# Patient Record
Sex: FEMALE | Race: WHITE | NOT HISPANIC OR LATINO | Employment: FULL TIME | URBAN - METROPOLITAN AREA
[De-identification: names, ages, dates, MRNs, and addresses within clinical notes are randomized per-mention and may not be internally consistent; named-entity substitution may affect disease eponyms.]

---

## 2022-01-17 ENCOUNTER — HOSPITAL ENCOUNTER (EMERGENCY)
Facility: HOSPITAL | Age: 22
Discharge: HOME | End: 2022-01-18
Admitting: EMERGENCY MEDICINE

## 2022-01-17 DIAGNOSIS — N93.9 ABNORMAL UTERINE BLEEDING: Primary | ICD-10-CM

## 2022-01-17 LAB
ABO GROUP BLD: NORMAL
ALBUMIN SERPL-MCNC: 4.6 G/DL (ref 3.5–5.2)
ALBUMIN/GLOB SERPL: 1.4 G/DL
ALP SERPL-CCNC: 70 U/L (ref 39–117)
ALT SERPL W P-5'-P-CCNC: 23 U/L (ref 1–33)
ANION GAP SERPL CALCULATED.3IONS-SCNC: 13 MMOL/L (ref 5–15)
AST SERPL-CCNC: 27 U/L (ref 1–32)
BASOPHILS # BLD AUTO: 0 10*3/MM3 (ref 0–0.2)
BASOPHILS NFR BLD AUTO: 0.7 % (ref 0–1.5)
BILIRUB SERPL-MCNC: 0.4 MG/DL (ref 0–1.2)
BILIRUB UR QL STRIP: NEGATIVE
BUN SERPL-MCNC: 9 MG/DL (ref 6–20)
BUN/CREAT SERPL: 13 (ref 7–25)
CALCIUM SPEC-SCNC: 9.6 MG/DL (ref 8.6–10.5)
CHLORIDE SERPL-SCNC: 104 MMOL/L (ref 98–107)
CLARITY UR: ABNORMAL
CLUE CELLS SPEC QL WET PREP: ABNORMAL
CO2 SERPL-SCNC: 25 MMOL/L (ref 22–29)
COLOR UR: YELLOW
CREAT SERPL-MCNC: 0.69 MG/DL (ref 0.57–1)
DEPRECATED RDW RBC AUTO: 40.7 FL (ref 37–54)
EOSINOPHIL # BLD AUTO: 0.1 10*3/MM3 (ref 0–0.4)
EOSINOPHIL NFR BLD AUTO: 2.1 % (ref 0.3–6.2)
ERYTHROCYTE [DISTWIDTH] IN BLOOD BY AUTOMATED COUNT: 12.7 % (ref 12.3–15.4)
GFR SERPL CREATININE-BSD FRML MDRD: 110 ML/MIN/1.73
GLOBULIN UR ELPH-MCNC: 3.4 GM/DL
GLUCOSE SERPL-MCNC: 76 MG/DL (ref 65–99)
GLUCOSE UR STRIP-MCNC: NEGATIVE MG/DL
HCG INTACT+B SERPL-ACNC: <0.1 MIU/ML
HCT VFR BLD AUTO: 43.1 % (ref 34–46.6)
HGB BLD-MCNC: 15.1 G/DL (ref 12–15.9)
HGB UR QL STRIP.AUTO: NEGATIVE
HYDATID CYST SPEC WET PREP: ABNORMAL
KETONES UR QL STRIP: NEGATIVE
LEUKOCYTE ESTERASE UR QL STRIP.AUTO: NEGATIVE
LYMPHOCYTES # BLD AUTO: 2.7 10*3/MM3 (ref 0.7–3.1)
LYMPHOCYTES NFR BLD AUTO: 53.5 % (ref 19.6–45.3)
MCH RBC QN AUTO: 31.9 PG (ref 26.6–33)
MCHC RBC AUTO-ENTMCNC: 35 G/DL (ref 31.5–35.7)
MCV RBC AUTO: 91.1 FL (ref 79–97)
MONOCYTES # BLD AUTO: 0.6 10*3/MM3 (ref 0.1–0.9)
MONOCYTES NFR BLD AUTO: 11 % (ref 5–12)
NEUTROPHILS NFR BLD AUTO: 1.6 10*3/MM3 (ref 1.7–7)
NEUTROPHILS NFR BLD AUTO: 32.7 % (ref 42.7–76)
NITRITE UR QL STRIP: NEGATIVE
NRBC BLD AUTO-RTO: 0.3 /100 WBC (ref 0–0.2)
NUMBER OF DOSES: NORMAL
PH UR STRIP.AUTO: 8.5 [PH] (ref 5–8)
PLATELET # BLD AUTO: 332 10*3/MM3 (ref 140–450)
PMV BLD AUTO: 6.6 FL (ref 6–12)
POTASSIUM SERPL-SCNC: 3.9 MMOL/L (ref 3.5–5.2)
PROT SERPL-MCNC: 8 G/DL (ref 6–8.5)
PROT UR QL STRIP: NEGATIVE
RBC # BLD AUTO: 4.73 10*6/MM3 (ref 3.77–5.28)
RH BLD: POSITIVE
SODIUM SERPL-SCNC: 142 MMOL/L (ref 136–145)
SP GR UR STRIP: 1.02 (ref 1–1.03)
T VAGINALIS SPEC QL WET PREP: ABNORMAL
UROBILINOGEN UR QL STRIP: ABNORMAL
WBC NRBC COR # BLD: 5 10*3/MM3 (ref 3.4–10.8)
WBC SPEC QL WET PREP: ABNORMAL
YEAST GENITAL QL WET PREP: ABNORMAL

## 2022-01-17 PROCEDURE — 99283 EMERGENCY DEPT VISIT LOW MDM: CPT

## 2022-01-17 PROCEDURE — 87491 CHLMYD TRACH DNA AMP PROBE: CPT | Performed by: NURSE PRACTITIONER

## 2022-01-17 PROCEDURE — 86900 BLOOD TYPING SEROLOGIC ABO: CPT | Performed by: NURSE PRACTITIONER

## 2022-01-17 PROCEDURE — 80053 COMPREHEN METABOLIC PANEL: CPT | Performed by: NURSE PRACTITIONER

## 2022-01-17 PROCEDURE — 85025 COMPLETE CBC W/AUTO DIFF WBC: CPT | Performed by: NURSE PRACTITIONER

## 2022-01-17 PROCEDURE — 81003 URINALYSIS AUTO W/O SCOPE: CPT | Performed by: NURSE PRACTITIONER

## 2022-01-17 PROCEDURE — P9612 CATHETERIZE FOR URINE SPEC: HCPCS

## 2022-01-17 PROCEDURE — 84702 CHORIONIC GONADOTROPIN TEST: CPT | Performed by: NURSE PRACTITIONER

## 2022-01-17 PROCEDURE — 87591 N.GONORRHOEAE DNA AMP PROB: CPT | Performed by: NURSE PRACTITIONER

## 2022-01-17 PROCEDURE — 87210 SMEAR WET MOUNT SALINE/INK: CPT | Performed by: NURSE PRACTITIONER

## 2022-01-17 PROCEDURE — 86901 BLOOD TYPING SEROLOGIC RH(D): CPT | Performed by: NURSE PRACTITIONER

## 2022-01-17 RX ORDER — SODIUM CHLORIDE 0.9 % (FLUSH) 0.9 %
10 SYRINGE (ML) INJECTION AS NEEDED
Status: DISCONTINUED | OUTPATIENT
Start: 2022-01-17 | End: 2022-01-18 | Stop reason: HOSPADM

## 2022-01-17 RX ORDER — MEDROXYPROGESTERONE ACETATE 150 MG/ML
150 INJECTION, SUSPENSION INTRAMUSCULAR
COMMUNITY

## 2022-01-18 VITALS
BODY MASS INDEX: 26.33 KG/M2 | TEMPERATURE: 98 F | DIASTOLIC BLOOD PRESSURE: 79 MMHG | HEART RATE: 92 BPM | WEIGHT: 158 LBS | HEIGHT: 65 IN | OXYGEN SATURATION: 100 % | SYSTOLIC BLOOD PRESSURE: 121 MMHG | RESPIRATION RATE: 15 BRPM

## 2022-01-18 LAB
C TRACH RRNA CVX QL NAA+PROBE: NOT DETECTED
N GONORRHOEA RRNA SPEC QL NAA+PROBE: NOT DETECTED

## 2022-01-18 NOTE — DISCHARGE INSTRUCTIONS
Needs Tylenol or ibuprofen for any pain or cramping.  Safe sex practices.  Follow-up with your primary care provider and/or OB/GYN.  Return for new or worsening symptoms.

## 2022-01-18 NOTE — ED PROVIDER NOTES
Subjective   Patient is a 21-year-old white female with no significant medical history who presents today with complaints of vaginal bleeding.  She states she takes a Depo-Provera shot every 3 months.  She states she has not missed any doses.  She states she does not normally have a menstrual cycle with this.  She states she had unprotected intercourse at the beginning of December.  She states 2 weeks ago she took home pregnancy test that returned positive.  She states she has had some breast tenderness and some low abdominal cramping over the last week or so.  She states over the last couple of days she has developed vaginal bleeding and at times has passed a couple of clots.  She states today later this evening the bleeding has subsided but she continues to have some light brown discharge.  She denies any concern for STI.  She denies any fever chills nausea vomiting or urinary complaint.          Review of Systems   Constitutional: Negative for chills and fever.   Gastrointestinal: Negative for abdominal pain, nausea and vomiting.   Genitourinary: Positive for pelvic pain, vaginal bleeding and vaginal discharge. Negative for dysuria, frequency and urgency.       Past Medical History:   Diagnosis Date   • Known health problems: none        No Known Allergies    Past Surgical History:   Procedure Laterality Date   • NO PAST SURGERIES         History reviewed. No pertinent family history.    Social History     Socioeconomic History   • Marital status: Single   Tobacco Use   • Smoking status: Never Smoker   • Smokeless tobacco: Never Used   Substance and Sexual Activity   • Alcohol use: Never   • Drug use: Never           Objective   Physical Exam  Vital signs and triage nurse note reviewed.  Constitutional: Awake, alert; well-developed and well-nourished. No acute distress is noted.  HEENT: Normocephalic, atraumatic; pupils are PERRL with intact EOM; oropharynx is pink and moist without exudate or erythema.  No  drooling or pooling of oral secretions.  Neck: Supple, full range of motion without pain; no cervical lymphadenopathy. Normal phonation.  Cardiovascular: Regular rate and rhythm, normal S1-S2.  No murmur noted.  Pulmonary: Respiratory effort regular nonlabored, breath sounds clear to auscultation all fields.  Abdomen: Soft, nontender, nondistended with normoactive bowel sounds; no rebound or guarding.  Musculoskeletal: Independent range of motion of all extremities with no palpable tenderness or edema.  Neuro: Alert oriented x3, speech is clear and appropriate, GCS 15.    Skin: Flesh tone, warm, dry, intact; no erythematous or petechial rash or lesion.    The patient was placed in lithotomy position. External genitalia were normal. There was no evidence of rash, external lesions or abscesses. Speculum was inserted. Posterior vaginal vault was examined.    There was no blood in the posterior vaginal vault.    There was minimal white discharge in the poster vaginal vault.    Bimanual exam was performed. Uterus was nonenlarged and nontender there was no adnexal enlargement or tenderness. No cervical motion tenderness. No masses were felt.     Procedures           ED Course      Labs Reviewed   WET PREP, GENITAL - Abnormal; Notable for the following components:       Result Value    WBC'S 2+ WBC's seen (*)     All other components within normal limits   URINALYSIS W/ CULTURE IF INDICATED - Abnormal; Notable for the following components:    Appearance, UA Turbid (*)     pH, UA 8.5 (*)     All other components within normal limits    Narrative:     Urine microscopic not indicated.   CBC WITH AUTO DIFFERENTIAL - Abnormal; Notable for the following components:    Neutrophil % 32.7 (*)     Lymphocyte % 53.5 (*)     Neutrophils, Absolute 1.60 (*)     nRBC 0.3 (*)     All other components within normal limits   CHLAMYDIA TRACHOMATIS, NEISSERIA GONORRHOEAE, PCR - Normal   COMPREHENSIVE METABOLIC PANEL    Narrative:     GFR  Normal >60  Chronic Kidney Disease <60  Kidney Failure <15     HCG, QUANTITATIVE, PREGNANCY    Narrative:     HCG Ranges by Gestational Age    Females - non-pregnant premenopausal   </= 1mIU/mL HCG  Females - postmenopausal               </= 7mIU/mL HCG    3 Weeks         5.8 -    71.2 mIU/mL  4 Weeks         9.5 -     750 mIU/mL  5 Weeks         217 -   7,138 mIU/mL  6 Weeks         158 -  31,795 mIU/mL  7 Weeks       3,697 - 163,563 mIU/mL  8 Weeks      32,065 - 149,571 mIU/mL  9 Weeks      63,803 - 151,410 mIU/mL  10 Weeks     46,509 - 186,977 mIU/mL  12 Weeks     27,832 - 210,612 mIU/mL  14 Weeks     13,950 -  62,530 mIU/mL  15 Weeks     12,039 -  70,971 mIU/mL  16 Weeks      9,040 -  56,451 mIU/mL  17 Weeks      8,175 -  55,868 mIU/mL  18 Weeks      8,099 -  58,176 mIU/mL  Results may be falsely decreased if patient taking Biotin.      RHIG EVALUATION   DOSES OF RH IMMUNE GLOBULIN   CBC AND DIFFERENTIAL    Narrative:     The following orders were created for panel order CBC & Differential.  Procedure                               Abnormality         Status                     ---------                               -----------         ------                     CBC Auto Differential[[id number]]        Abnormal            Final result                 Please view results for these tests on the individual orders.     No radiology results for the last day  Medications   sodium chloride 0.9 % flush 10 mL (has no administration in time range)                                                MDM  Number of Diagnoses or Management Options  Abnormal uterine bleeding  Diagnosis management comments: Patient had an IV established.  She had labs obtained.    Work-up: CBC and metabolic panel are unremarkable.  Serum quantitative hCG is<0.10.  Urinalysis shows no blood or sign of infection.  Wet prep reveals 2+ WBCs, no yeast, no BV, no trichomonas.  Gonorrhea chlamydia both negative.    On reexamination patient is  resting comfortably.  She is in no distress.  She has no new complaints.  She has had no bleeding since arrival to the ED.  She is hemodynamically stable.  She is well-appearing and has stable vital signs.  No evidence of pregnancy on her work-up today.  No signs of infection.    Diagnosis and treatment plan discussed with patient.  Patient agreeable to plan.   I discussed findings with patient who voices understanding of discharge instructions, signs and symptoms requiring return to ED; discharged improved and in stable condition with follow up for re-evaluation.         Amount and/or Complexity of Data Reviewed  Clinical lab tests: reviewed and ordered    Patient Progress  Patient progress: stable      Final diagnoses:   Abnormal uterine bleeding       ED Disposition  ED Disposition     ED Disposition Condition Comment    Discharge Stable           Mya Lyman MD  [address] [place], [place] [id number] [phone]    Schedule an appointment as soon as possible for a visit       OB[address] [address] [phone]  Schedule an appointment as soon as possible for a visit            Medication List      No changes were made to your prescriptions during this visit.          Brenda Marie, APRN  01/18/22 0046

## 2023-02-05 ENCOUNTER — HOSPITAL ENCOUNTER (EMERGENCY)
Facility: HOSPITAL | Age: 23
Discharge: HOME | End: 2023-02-05
Attending: EMERGENCY MEDICINE | Admitting: EMERGENCY MEDICINE
Payer: COMMERCIAL

## 2023-02-05 VITALS
TEMPERATURE: 98.9 F | OXYGEN SATURATION: 99 % | WEIGHT: 165 LBS | HEART RATE: 100 BPM | SYSTOLIC BLOOD PRESSURE: 127 MMHG | DIASTOLIC BLOOD PRESSURE: 86 MMHG | BODY MASS INDEX: 28.17 KG/M2 | RESPIRATION RATE: 16 BRPM | HEIGHT: 64 IN

## 2023-02-05 DIAGNOSIS — J06.9 UPPER RESPIRATORY TRACT INFECTION, UNSPECIFIED TYPE: ICD-10-CM

## 2023-02-05 DIAGNOSIS — J02.9 PHARYNGITIS, UNSPECIFIED ETIOLOGY: Primary | ICD-10-CM

## 2023-02-05 LAB
FLUAV SUBTYP SPEC NAA+PROBE: NOT DETECTED
FLUBV RNA ISLT QL NAA+PROBE: NOT DETECTED
SARS-COV-2 RNA RESP QL NAA+PROBE: NOT DETECTED
STREP A PCR: NOT DETECTED

## 2023-02-05 PROCEDURE — 99283 EMERGENCY DEPT VISIT LOW MDM: CPT

## 2023-02-05 PROCEDURE — 87636 SARSCOV2 & INF A&B AMP PRB: CPT | Performed by: EMERGENCY MEDICINE

## 2023-02-05 PROCEDURE — 87651 STREP A DNA AMP PROBE: CPT | Performed by: EMERGENCY MEDICINE

## 2023-02-05 PROCEDURE — C9803 HOPD COVID-19 SPEC COLLECT: HCPCS | Performed by: EMERGENCY MEDICINE

## 2023-02-05 PROCEDURE — 0202U NFCT DS 22 TRGT SARS-COV-2: CPT | Performed by: EMERGENCY MEDICINE

## 2023-02-05 PROCEDURE — 99283 EMERGENCY DEPT VISIT LOW MDM: CPT | Performed by: EMERGENCY MEDICINE

## 2023-02-05 PROCEDURE — 87081 CULTURE SCREEN ONLY: CPT | Performed by: EMERGENCY MEDICINE

## 2023-02-06 LAB
B PARAPERT DNA SPEC QL NAA+PROBE: NOT DETECTED
B PERT DNA SPEC QL NAA+PROBE: NOT DETECTED
C PNEUM DNA NPH QL NAA+NON-PROBE: NOT DETECTED
FLUAV SUBTYP SPEC NAA+PROBE: NOT DETECTED
FLUBV RNA ISLT QL NAA+PROBE: NOT DETECTED
HADV DNA SPEC NAA+PROBE: NOT DETECTED
HCOV 229E RNA SPEC QL NAA+PROBE: DETECTED
HCOV HKU1 RNA SPEC QL NAA+PROBE: NOT DETECTED
HCOV NL63 RNA SPEC QL NAA+PROBE: NOT DETECTED
HCOV OC43 RNA SPEC QL NAA+PROBE: NOT DETECTED
HMPV RNA NPH QL NAA+NON-PROBE: NOT DETECTED
HPIV1 RNA ISLT QL NAA+PROBE: NOT DETECTED
HPIV2 RNA SPEC QL NAA+PROBE: NOT DETECTED
HPIV3 RNA NPH QL NAA+PROBE: NOT DETECTED
HPIV4 P GENE NPH QL NAA+PROBE: NOT DETECTED
M PNEUMO IGG SER IA-ACNC: NOT DETECTED
RHINOVIRUS RNA SPEC NAA+PROBE: NOT DETECTED
RSV RNA NPH QL NAA+NON-PROBE: NOT DETECTED
SARS-COV-2 RNA NPH QL NAA+NON-PROBE: NOT DETECTED

## 2023-02-06 NOTE — DISCHARGE INSTRUCTIONS
In medicine there is always a level of diagnostic uncertainty. Even if it is low. For this reason, immediately return to the emergency department if you have any new symptoms, worsening symptoms, change of symptoms, or if you have any other concerns. We would be happy to re-evaluate you. Otherwise, please take your medications as prescribed and follow-up as recommended. This paperwork can be taken to your primary care provider to further discuss any non-emergent lab and/or imaging findings.    You can alternate 650 mg acetaminophen and  600 mg ibuprofen every 3 hours as needed for pain. Example: noon - ibuprofen, 3PM - acetaminophen, 6PM - ibuprofen, 9PM - acetaminophen. These medications can be bought without a prescription over the counter.    You can use NeilMed Sinus Rinse, Afrin, and Sucrets as directed on the box.

## 2023-02-06 NOTE — FSED PROVIDER NOTE
[facility]    Pt Name: Kenyetta Sarah  MRN: [id number]  Birthdate 2000  Date of evaluation: 2/5/2023  Provider: Se Longoria MD     CHIEF CONCERN     Chief Complaint   Patient presents with   • Sore Throat   • Fever       HISTORY OF PRESENT ILLNESS   Reports 2 days sore throat, congestion, malaise, fever, chills. Severity moderate. Taking OTC medications with improvement. No known sick contacts. No cough.      REVIEW OF SYSTEMS     Constitutional: Per HPI.  HENT: Per HP. No congestion.  Eye: No double vision. No blurry vision. No eye pain.  Respiratory: No cough. No dyspnea. No wheezing.  Cardiovascular: No chest pain. No palpitations. No lightheadedness. No leg swelling.  GI: No abdominal pain. No diarrhea. No constipation. No nausea. No vomiting.  : No frequency. No dysuria. No urgency. Take OCP daily.  MSK: No arthralgias. No myalgias.   Skin: No rashes.   Neuro: No numbness. No tingling. No weakness. No headache.  Psych: No suicidal ideation. No homicidal ideation.    PAST MEDICAL HISTORY     Past Medical History:   Diagnosis Date   • Known health problems: none        SURGICAL HISTORY       Past Surgical History:   Procedure Laterality Date   • NO PAST SURGERIES         CURRENT MEDICATIONS          Medication List      CONTINUE taking these medications    medroxyPROGESTERone 150 MG/ML injection  Commonly known as: DEPO-PROVERA            ALLERGIES     Patient has no known allergies.    FAMILY HISTORY     History reviewed. No pertinent family history.     SOCIAL HISTORY       Social History     Socioeconomic History   • Marital status: Single   Tobacco Use   • Smoking status: Never   • Smokeless tobacco: Never   Substance and Sexual Activity   • Alcohol use: Never   • Drug use: Never       SCREENINGS           PHYSICAL EXAM      Vitals:    02/05/23 2213   BP: 127/86   BP Location: Left arm   Patient Position: Sitting   Pulse: 100   Resp: 16   Temp: 98.9 °F (37.2 °C)  "  TempSrc: Oral   SpO2: 99%   Weight: 74.8 kg (165 lb)   Height: 162.6 cm (64\")     General: Nontoxic. Conversational. Appears stated age.  Skin: Warm. Dry. Intact. No systemic rashes or lesions.  Eyes: Pupils are equally round and reactive to light. Extraocular motions are intact. Clear sclera. No gaze preference.  HENT: Atraumatic. Normocephalic. Trachea midline. Mucosal membranes moist. No trismus. No malocclusion. Uvula midline. No stridor. Tolerating secretions. Posterior oropharynx erythematous. Exudate present. Nares patent.   Lungs: Clear to auscultation throughout. Nonlabored breathing.  Cardiovascular: No murmurs, rubs, or gallops appreciated. No pedal edema. No JVD. Symmetric radial pulses. Symmetric dorsal pedis pulses.   Abdomen: Soft and nontender. Nondistended. Bowel sounds are present.  Musculoskeletal: No asymmetry. No deformities. No effusions.  Neuro: Alert. Oriented to person, place, year. No facial asymmetry. Facial sensation symmetric. No aphasia. No dysarthria. Midline tongue protrusion. Posterior oropharynx with symmetric elevation. Ambulatory with purposeful use of upper extremities.   Psych: Appropriate. Cooperative.    REVIEWED DIAGNOSTIC RESULTS     RADIOLOGY   No radiology results for the last day      LABS:  Labs Reviewed   RAPID STREP A SCREEN - Normal   COVID-19 AND FLU A/B, NP SWAB IN TRANSPORT MEDIA 8-12 HR TAT - Normal    Narrative:     Fact sheet for providers: https://www.fda.gov/media/691341/download    Fact sheet for patients: https://www.fda.gov/media/965106/download    Test performed by PCR.   RESPIRATORY PANEL PCR W/ COVID-19 (SARS-COV-2) [facility] IN-HOUSE, NP SWAB IN UTM/VTP, 3-4 HR TAT   THROAT / UPPER RESPIRATORY CULTURE       All other labs were within normal range or not returned as of this dictation.     EMERGENCY DEPARTMENT COURSE and DIFFERENTIAL DIAGNOSIS/MDM:     · Nursing notes were reviewed. Patient was evaluated. Orders placed as below.  I had " an extensive conversation with Kenyetta Sarah regarding testing and treatment of influenza. Kenyetta Sarah is with capacity to make decision and DECLINES targeted and/or empiric treatment. Kenyetta Sarah is with capacity to make decisions. The decision was respected.   · Labs reviewed.  · Patient re-evaluated. All test findings discussed. No new symptoms.    Medications - No data to display  Orders Placed This Encounter   Procedures   • Rapid Strep A Screen - Swab, Throat   • COVID-19 and FLU A/B PCR - Swab, Nasopharynx   • Respiratory Panel PCR w/COVID-19(SARS-CoV-2) [facility] In-House, NP Swab in UTM/VTM, 3-4 HR TAT - Swab, Nasopharynx   • Throat / Upper Respiratory Culture - Swab, Throat       PROCEDURES (if any):  Procedures    FINAL IMPRESSION     1. Pharyngitis, unspecified etiology    2. Upper respiratory tract infection, unspecified type        Following this emergency department evaluation, I estimate a LOW probability of drainable periapical abscess, peritonsillar abscess, retropharyngeal abscess, other suppurative complication, meningitis, encephalitis, Lemierre's syndrome, Miguel's angina, epiglottitis, carotid dissection, mastoiditis, angioedema, anaphylaxis, foreign body, among other etiologies to presentation.     I estimate a VERY LOW probability for acute life or limb-threatening illness. I discussed this with Kenyetta Sarah and discussed symptoms that would warrant return to the emergency department. I underscored the importance of following up as directed in the discharge instructions. Kenyetta Sarah understood and was agreeable. All questions were answered.      DISPOSITION/PLAN     ED Disposition     ED Disposition   Discharge    Condition   Stable    Comment   --             PATIENT REFERRED TO:  Mya Lyman MD  [address] [place] [address] [place] [address] [phone]    Schedule an appointment as soon as possible for a visit         DISCHARGE MEDICATIONS:     Medication List       CONTINUE taking these medications    medroxyPROGESTERone 150 MG/ML injection  Commonly known as: DEPO-PROVERA

## 2023-02-08 LAB — BACTERIA SPEC AEROBE CULT: NORMAL

## 2023-07-14 LAB
EXTERNAL HEPATITIS B SURFACE ANTIGEN: NEGATIVE
EXTERNAL VDRL: NON REACTIVE
HIV1 P24 AG SERPL QL IA: NORMAL

## 2023-12-25 ENCOUNTER — APPOINTMENT (OUTPATIENT)
Dept: ULTRASOUND IMAGING | Facility: HOSPITAL | Age: 23
End: 2023-12-25
Payer: COMMERCIAL

## 2023-12-25 ENCOUNTER — HOSPITAL ENCOUNTER (OUTPATIENT)
Facility: HOSPITAL | Age: 23
Discharge: HOME | End: 2023-12-26
Attending: OBSTETRICS & GYNECOLOGY | Admitting: OBSTETRICS & GYNECOLOGY
Payer: COMMERCIAL

## 2023-12-25 PROCEDURE — 76819 FETAL BIOPHYS PROFIL W/O NST: CPT

## 2023-12-25 PROCEDURE — G0463 HOSPITAL OUTPT CLINIC VISIT: HCPCS

## 2023-12-25 RX ORDER — PRENATAL VIT/IRON FUM/FOLIC AC 27MG-0.8MG
1 TABLET ORAL DAILY
COMMUNITY

## 2023-12-25 RX ORDER — NIFEDIPINE 10 MG/1
10 CAPSULE ORAL ONCE
Status: COMPLETED | OUTPATIENT
Start: 2023-12-26 | End: 2023-12-26

## 2023-12-25 RX ORDER — ASPIRIN 81 MG/1
81 TABLET ORAL DAILY
COMMUNITY

## 2023-12-26 VITALS
WEIGHT: 217.37 LBS | TEMPERATURE: 98.2 F | HEIGHT: 64 IN | SYSTOLIC BLOOD PRESSURE: 124 MMHG | HEART RATE: 84 BPM | RESPIRATION RATE: 16 BRPM | OXYGEN SATURATION: 98 % | DIASTOLIC BLOOD PRESSURE: 83 MMHG | BODY MASS INDEX: 37.11 KG/M2

## 2023-12-26 RX ADMIN — NIFEDIPINE 10 MG: 10 CAPSULE ORAL at 00:22

## 2023-12-26 NOTE — SIGNIFICANT NOTE
MD called with new admission of  pt that presented to the unit with c/o decreased fetal movement after trying to drink water, taking a nap and doing kick counts today. Pt states she has not had a lot of water today. Pt denies any other complaints at this time. MD gave order for BPP, cervical exam and Procardia 10mg x1 dose. Orders placed at this time.

## 2024-01-10 ENCOUNTER — HOSPITAL ENCOUNTER (OUTPATIENT)
Facility: HOSPITAL | Age: 24
Discharge: HOME | End: 2024-01-10
Attending: OBSTETRICS & GYNECOLOGY | Admitting: OBSTETRICS & GYNECOLOGY
Payer: MEDICAID

## 2024-01-10 VITALS
HEIGHT: 65 IN | SYSTOLIC BLOOD PRESSURE: 133 MMHG | HEART RATE: 72 BPM | BODY MASS INDEX: 36.62 KG/M2 | OXYGEN SATURATION: 100 % | DIASTOLIC BLOOD PRESSURE: 84 MMHG | RESPIRATION RATE: 16 BRPM | TEMPERATURE: 98.1 F | WEIGHT: 219.8 LBS

## 2024-01-10 PROBLEM — Z34.90 PREGNANT AND NOT YET DELIVERED: Status: ACTIVE | Noted: 2024-01-10

## 2024-01-10 LAB
BACTERIA UR QL AUTO: ABNORMAL /HPF
BILIRUB UR QL STRIP: NEGATIVE
CLARITY UR: ABNORMAL
COLOR UR: YELLOW
GLUCOSE UR STRIP-MCNC: NEGATIVE MG/DL
HGB UR QL STRIP.AUTO: NEGATIVE
HYALINE CASTS UR QL AUTO: ABNORMAL /LPF
KETONES UR QL STRIP: NEGATIVE
LEUKOCYTE ESTERASE UR QL STRIP.AUTO: ABNORMAL
NITRITE UR QL STRIP: NEGATIVE
PH UR STRIP.AUTO: 8 [PH] (ref 5–8)
PROT UR QL STRIP: NEGATIVE
RBC # UR STRIP: ABNORMAL /HPF
REF LAB TEST METHOD: ABNORMAL
SP GR UR STRIP: 1.02 (ref 1–1.03)
SQUAMOUS #/AREA URNS HPF: ABNORMAL /HPF
UROBILINOGEN UR QL STRIP: ABNORMAL
WBC # UR STRIP: ABNORMAL /HPF

## 2024-01-10 PROCEDURE — 81001 URINALYSIS AUTO W/SCOPE: CPT | Performed by: OBSTETRICS & GYNECOLOGY

## 2024-01-10 PROCEDURE — G0463 HOSPITAL OUTPT CLINIC VISIT: HCPCS

## 2024-01-10 PROCEDURE — 87086 URINE CULTURE/COLONY COUNT: CPT | Performed by: OBSTETRICS & GYNECOLOGY

## 2024-01-10 NOTE — SIGNIFICANT NOTE
Notified MD that  presents at 34w5d /c c/o ctxs, lower back pain, and urinary urgency. SVE closed/thick/high. Uterine irritability noted. Urine collected. Pt afebrile. Orders received to PO hydrate and send urine for UA.

## 2024-01-11 LAB — BACTERIA SPEC AEROBE CULT: NORMAL

## 2024-01-18 ENCOUNTER — HOSPITAL ENCOUNTER (OUTPATIENT)
Facility: HOSPITAL | Age: 24
Discharge: HOME | End: 2024-01-18
Attending: OBSTETRICS & GYNECOLOGY | Admitting: OBSTETRICS & GYNECOLOGY
Payer: COMMERCIAL

## 2024-01-18 VITALS
OXYGEN SATURATION: 99 % | RESPIRATION RATE: 16 BRPM | HEART RATE: 78 BPM | DIASTOLIC BLOOD PRESSURE: 81 MMHG | WEIGHT: 227.07 LBS | HEIGHT: 65 IN | SYSTOLIC BLOOD PRESSURE: 138 MMHG | BODY MASS INDEX: 37.83 KG/M2 | TEMPERATURE: 97.9 F

## 2024-01-18 LAB
ALBUMIN SERPL-MCNC: 3.3 G/DL (ref 3.5–5.2)
ALBUMIN/GLOB SERPL: 1.1 G/DL
ALP SERPL-CCNC: 143 U/L (ref 39–117)
ALT SERPL W P-5'-P-CCNC: 16 U/L (ref 1–33)
ANION GAP SERPL CALCULATED.3IONS-SCNC: 13 MMOL/L (ref 5–15)
AST SERPL-CCNC: 17 U/L (ref 1–32)
BILIRUB SERPL-MCNC: <0.2 MG/DL (ref 0–1.2)
BUN SERPL-MCNC: 9 MG/DL (ref 6–20)
BUN/CREAT SERPL: 15.5 (ref 7–25)
CALCIUM SPEC-SCNC: 9 MG/DL (ref 8.6–10.5)
CHLORIDE SERPL-SCNC: 106 MMOL/L (ref 98–107)
CO2 SERPL-SCNC: 20 MMOL/L (ref 22–29)
CREAT SERPL-MCNC: 0.58 MG/DL (ref 0.57–1)
CREAT UR-MCNC: 117.8 MG/DL
DEPRECATED RDW RBC AUTO: 43.8 FL (ref 37–54)
EGFRCR SERPLBLD CKD-EPI 2021: 132.2 ML/MIN/1.73
ERYTHROCYTE [DISTWIDTH] IN BLOOD BY AUTOMATED COUNT: 13.4 % (ref 12.3–15.4)
EXTERNAL GROUP B STREP ANTIGEN: NORMAL
GLOBULIN UR ELPH-MCNC: 3.1 GM/DL
GLUCOSE SERPL-MCNC: 102 MG/DL (ref 65–99)
HCT VFR BLD AUTO: 38.6 % (ref 34–46.6)
HGB BLD-MCNC: 12.9 G/DL (ref 12–15.9)
MCH RBC QN AUTO: 31.4 PG (ref 26.6–33)
MCHC RBC AUTO-ENTMCNC: 33.3 G/DL (ref 31.5–35.7)
MCV RBC AUTO: 94.1 FL (ref 79–97)
PLATELET # BLD AUTO: 284 10*3/MM3 (ref 140–450)
PMV BLD AUTO: 8.5 FL (ref 6–12)
POTASSIUM SERPL-SCNC: 4 MMOL/L (ref 3.5–5.2)
PROT ?TM UR-MCNC: 19.6 MG/DL
PROT SERPL-MCNC: 6.4 G/DL (ref 6–8.5)
PROT/CREAT UR: 166.4 MG/G CREA (ref 0–200)
RBC # BLD AUTO: 4.1 10*6/MM3 (ref 3.77–5.28)
SODIUM SERPL-SCNC: 139 MMOL/L (ref 136–145)
WBC NRBC COR # BLD AUTO: 12.5 10*3/MM3 (ref 3.4–10.8)

## 2024-01-18 PROCEDURE — 85027 COMPLETE CBC AUTOMATED: CPT | Performed by: OBSTETRICS & GYNECOLOGY

## 2024-01-18 PROCEDURE — 84156 ASSAY OF PROTEIN URINE: CPT | Performed by: OBSTETRICS & GYNECOLOGY

## 2024-01-18 PROCEDURE — 80053 COMPREHEN METABOLIC PANEL: CPT | Performed by: OBSTETRICS & GYNECOLOGY

## 2024-01-18 PROCEDURE — G0463 HOSPITAL OUTPT CLINIC VISIT: HCPCS

## 2024-01-18 PROCEDURE — 82570 ASSAY OF URINE CREATININE: CPT | Performed by: OBSTETRICS & GYNECOLOGY

## 2024-01-18 RX ORDER — SODIUM CHLORIDE 0.9 % (FLUSH) 0.9 %
10 SYRINGE (ML) INJECTION AS NEEDED
Status: DISCONTINUED | OUTPATIENT
Start: 2024-01-18 | End: 2024-01-18 | Stop reason: HOSPADM

## 2024-01-18 NOTE — DISCHARGE SUMMARY
Date of Discharge:  2024    Presenting Problem/History of Present Illness:  23 y.o. female  currently at 35w6d        Discharge Diagnosis:   Elevated blood pressures in pregnancy       Hospital Course:  Patient is a 23 y.o. female  currently at 35w6d, presented with elevated blood pressures in clinic and swelling. She denies headache, vision changes, chest pain, dyspnea. She reports good FM, and denies vaginal bleeding and LOF. She had US in office with normal SALAS and BPP .   Had extended BP monitoring which showed elevated Bps with the highest being 151/98 , all other were normotensive or low mild range. Had PIH labs wich showed normal Cr, AST/ALT, normal platelets, and P/C ratio < 0.3.   Patient given strict return precautions for signs and symptoms for severe preeclampsia including headache, vision changes, chest pain, dyspnea, palpitations, RUQ pain, and worsening LE swelling. Patient works at [employer] on her feet 8 or more hours a day, counseled on staying off of work until FU in the office in 1 week. Has apt with Dr. Robbins on , but scheduled with  or Monday or Tuesday for BP check. Counseled patient to discuss delivery planning with primary OB due to elevated Bps and concern for GHTN in pregnancy.   Discharged with labor precautions, FKC, and preeclampsia precautions.      Pertinent Test Results:   Lab Results (last 72 hours)       Procedure Component Value Units Date/Time    Protein / Creatinine Ratio, Urine - Urine, Clean Catch [[id number]] Collected: 24 1601    Specimen: Urine, Clean Catch Updated: 24 1701     Protein/Creatinine Ratio, Urine 166.4 mg/G Crea      Creatinine, Urine 117.8 mg/dL      Total Protein, Urine 19.6 mg/dL     Comprehensive Metabolic Panel [[id number]]  (Abnormal) Collected: 24 1508    Specimen: Blood from Arm, Left Updated: 24 1605     Glucose 102 mg/dL      BUN 9 mg/dL      Creatinine 0.58 mg/dL      Sodium 139 mmol/L      Potassium 4.0  mmol/L      Chloride 106 mmol/L      CO2 20.0 mmol/L      Calcium 9.0 mg/dL      Total Protein 6.4 g/dL      Albumin 3.3 g/dL      ALT (SGPT) 16 U/L      AST (SGOT) 17 U/L      Alkaline Phosphatase 143 U/L      Total Bilirubin <0.2 mg/dL      Globulin 3.1 gm/dL      A/G Ratio 1.1 g/dL      BUN/Creatinine Ratio 15.5     Anion Gap 13.0 mmol/L      eGFR 132.2 mL/min/1.73     Narrative:      GFR Normal >60  Chronic Kidney Disease <60  Kidney Failure <15      CBC (No Diff) [[id number]]  (Abnormal) Collected: 01/18/24 1508    Specimen: Blood from Arm, Left Updated: 01/18/24 1547     WBC 12.50 10*3/mm3      RBC 4.10 10*6/mm3      Hemoglobin 12.9 g/dL      Hematocrit 38.6 %      MCV 94.1 fL      MCH 31.4 pg      MCHC 33.3 g/dL      RDW 13.4 %      RDW-SD 43.8 fl      MPV 8.5 fL      Platelets 284 10*3/mm3           Imaging Results (Last 72 Hours)       ** No results found for the last 72 hours. **            Condition on Discharge:  Good    Vital Signs:  Vitals:    01/18/24 1600 01/18/24 1615 01/18/24 1630 01/18/24 1645   BP: 122/79 133/74 129/79 138/81   BP Location:       Patient Position:       Pulse: 85 84 81 78   Resp:       Temp:       TempSrc:       SpO2:   100% 99%   Weight:       Height:           Physical Exam:     General Appearance:    Alert, cooperative, in no acute distress   Lungs:     Non labored respirations regular, even and                   unlabored    Heart:    Regular rhythm and normal rate.        Abdomen:    Soft, non tender, gravid     Pelvic Exam:    DEF during observation    Extremities:    Fetal Assessment:   Moves all extremities well, trace edema, no cyanosis, no             redness   RNST [place]: quiet       Discharge Disposition:  Home    Discharge Medications:     Discharge Medications        ASK your doctor about these medications        Instructions Start Date   aspirin 81 MG EC tablet   81 mg, Oral, Daily      prenatal vitamin 27-0.8 27-0.8 MG tablet tablet   1 tablet, Oral, Daily                Follow-up Appointments  Follow-up appointment with Dr. Meghan Robbins in 1 week    Test Results Pending at Discharge       Luciana Blas MD  01/18/24  18:01 EST

## 2024-01-18 NOTE — LETTER
January 18, 2024     Patient: Kenyetta Sarah   YOB: 2000   Date of Visit: 1/10/2024       To Whom It May Concern:    Per OB recommendation, Kenyetta Sarah should remain out of work until 02/02/24 due to high-risk pregnancy complicated by elevated blood pressure.       Sincerely,        [facility]   Labor & Delivery

## 2024-01-22 ENCOUNTER — HOSPITAL ENCOUNTER (INPATIENT)
Facility: HOSPITAL | Age: 24
LOS: 5 days | Discharge: HOME | End: 2024-01-27
Attending: OBSTETRICS & GYNECOLOGY | Admitting: OBSTETRICS & GYNECOLOGY
Payer: MEDICAID

## 2024-01-22 DIAGNOSIS — O14.13 SEVERE PREECLAMPSIA, THIRD TRIMESTER: Primary | ICD-10-CM

## 2024-01-22 DIAGNOSIS — Z34.93 PREGNANT AND NOT YET DELIVERED IN THIRD TRIMESTER: ICD-10-CM

## 2024-01-22 PROBLEM — Z34.90 ENCOUNTER FOR INDUCTION OF LABOR: Status: ACTIVE | Noted: 2024-01-22

## 2024-01-22 LAB
ABO GROUP BLD: NORMAL
ALBUMIN SERPL-MCNC: 3.4 G/DL (ref 3.5–5.2)
ALBUMIN/GLOB SERPL: 1.1 G/DL
ALP SERPL-CCNC: 139 U/L (ref 39–117)
ALT SERPL W P-5'-P-CCNC: 11 U/L (ref 1–33)
ANION GAP SERPL CALCULATED.3IONS-SCNC: 13 MMOL/L (ref 5–15)
AST SERPL-CCNC: 19 U/L (ref 1–32)
BILIRUB SERPL-MCNC: 0.2 MG/DL (ref 0–1.2)
BLD GP AB SCN SERPL QL: NEGATIVE
BUN SERPL-MCNC: 7 MG/DL (ref 6–20)
BUN/CREAT SERPL: 14 (ref 7–25)
CALCIUM SPEC-SCNC: 8.8 MG/DL (ref 8.6–10.5)
CHLORIDE SERPL-SCNC: 102 MMOL/L (ref 98–107)
CO2 SERPL-SCNC: 20 MMOL/L (ref 22–29)
CREAT SERPL-MCNC: 0.5 MG/DL (ref 0.57–1)
DEPRECATED RDW RBC AUTO: 42.9 FL (ref 37–54)
EGFRCR SERPLBLD CKD-EPI 2021: 137 ML/MIN/1.73
ERYTHROCYTE [DISTWIDTH] IN BLOOD BY AUTOMATED COUNT: 13.2 % (ref 12.3–15.4)
GLOBULIN UR ELPH-MCNC: 3 GM/DL
GLUCOSE SERPL-MCNC: 70 MG/DL (ref 65–99)
HCT VFR BLD AUTO: 38.1 % (ref 34–46.6)
HGB BLD-MCNC: 12.7 G/DL (ref 12–15.9)
MCH RBC QN AUTO: 31.2 PG (ref 26.6–33)
MCHC RBC AUTO-ENTMCNC: 33.4 G/DL (ref 31.5–35.7)
MCV RBC AUTO: 93.3 FL (ref 79–97)
PLATELET # BLD AUTO: 275 10*3/MM3 (ref 140–450)
PMV BLD AUTO: 8.5 FL (ref 6–12)
POTASSIUM SERPL-SCNC: 4.3 MMOL/L (ref 3.5–5.2)
PROT SERPL-MCNC: 6.4 G/DL (ref 6–8.5)
RBC # BLD AUTO: 4.08 10*6/MM3 (ref 3.77–5.28)
RH BLD: POSITIVE
SODIUM SERPL-SCNC: 135 MMOL/L (ref 136–145)
T PALLIDUM IGG SER QL: NORMAL
T&S EXPIRATION DATE: NORMAL
WBC NRBC COR # BLD AUTO: 11.8 10*3/MM3 (ref 3.4–10.8)

## 2024-01-22 PROCEDURE — 86901 BLOOD TYPING SEROLOGIC RH(D): CPT | Performed by: OBSTETRICS & GYNECOLOGY

## 2024-01-22 PROCEDURE — 86780 TREPONEMA PALLIDUM: CPT | Performed by: OBSTETRICS & GYNECOLOGY

## 2024-01-22 PROCEDURE — 80053 COMPREHEN METABOLIC PANEL: CPT | Performed by: OBSTETRICS & GYNECOLOGY

## 2024-01-22 PROCEDURE — 86850 RBC ANTIBODY SCREEN: CPT | Performed by: OBSTETRICS & GYNECOLOGY

## 2024-01-22 PROCEDURE — 85027 COMPLETE CBC AUTOMATED: CPT | Performed by: OBSTETRICS & GYNECOLOGY

## 2024-01-22 PROCEDURE — 10907ZC DRAINAGE OF AMNIOTIC FLUID, THERAPEUTIC FROM PRODUCTS OF CONCEPTION, VIA NATURAL OR ARTIFICIAL OPENING: ICD-10-PCS | Performed by: OBSTETRICS & GYNECOLOGY

## 2024-01-22 PROCEDURE — 86900 BLOOD TYPING SEROLOGIC ABO: CPT | Performed by: OBSTETRICS & GYNECOLOGY

## 2024-01-22 RX ORDER — OXYTOCIN/0.9 % SODIUM CHLORIDE 30/500 ML
2 PLASTIC BAG, INJECTION (ML) INTRAVENOUS
Status: DISCONTINUED | OUTPATIENT
Start: 2024-01-23 | End: 2024-01-24

## 2024-01-22 RX ORDER — MAGNESIUM CARB/ALUMINUM HYDROX 105-160MG
30 TABLET,CHEWABLE ORAL ONCE AS NEEDED
Status: DISCONTINUED | OUTPATIENT
Start: 2024-01-22 | End: 2024-01-24

## 2024-01-22 RX ORDER — LABETALOL 200 MG/1
200 TABLET, FILM COATED ORAL EVERY 12 HOURS SCHEDULED
Status: DISCONTINUED | OUTPATIENT
Start: 2024-01-22 | End: 2024-01-27 | Stop reason: HOSPADM

## 2024-01-22 RX ORDER — SODIUM CHLORIDE 9 MG/ML
40 INJECTION, SOLUTION INTRAVENOUS AS NEEDED
Status: DISCONTINUED | OUTPATIENT
Start: 2024-01-22 | End: 2024-01-24

## 2024-01-22 RX ORDER — SODIUM CHLORIDE 0.9 % (FLUSH) 0.9 %
10 SYRINGE (ML) INJECTION EVERY 12 HOURS SCHEDULED
Status: DISCONTINUED | OUTPATIENT
Start: 2024-01-22 | End: 2024-01-24

## 2024-01-22 RX ORDER — SODIUM CHLORIDE 0.9 % (FLUSH) 0.9 %
10 SYRINGE (ML) INJECTION AS NEEDED
Status: DISCONTINUED | OUTPATIENT
Start: 2024-01-22 | End: 2024-01-24

## 2024-01-22 RX ORDER — LIDOCAINE HYDROCHLORIDE 10 MG/ML
30 INJECTION, SOLUTION EPIDURAL; INFILTRATION; INTRACAUDAL; PERINEURAL ONCE AS NEEDED
Status: DISCONTINUED | OUTPATIENT
Start: 2024-01-22 | End: 2024-01-24

## 2024-01-22 RX ORDER — SODIUM CHLORIDE, SODIUM LACTATE, POTASSIUM CHLORIDE, CALCIUM CHLORIDE 600; 310; 30; 20 MG/100ML; MG/100ML; MG/100ML; MG/100ML
125 INJECTION, SOLUTION INTRAVENOUS CONTINUOUS
Status: DISCONTINUED | OUTPATIENT
Start: 2024-01-22 | End: 2024-01-23 | Stop reason: SDUPTHER

## 2024-01-22 RX ORDER — ACETAMINOPHEN 325 MG/1
650 TABLET ORAL EVERY 4 HOURS PRN
Status: DISCONTINUED | OUTPATIENT
Start: 2024-01-22 | End: 2024-01-24

## 2024-01-22 RX ADMIN — LABETALOL HYDROCHLORIDE 200 MG: 200 TABLET, FILM COATED ORAL at 14:29

## 2024-01-22 RX ADMIN — DINOPROSTONE 10 MG: 10 INSERT VAGINAL at 11:33

## 2024-01-22 RX ADMIN — ACETAMINOPHEN 650 MG: 325 TABLET, FILM COATED ORAL at 18:13

## 2024-01-22 NOTE — NURSING NOTE
Pt had just gotten up to BR put blood pressure cuff back on herself, elevated BP , retaken still elevated, took cuff off and readjusted blood pressure within normal range.

## 2024-01-23 ENCOUNTER — ANESTHESIA (OUTPATIENT)
Dept: LABOR AND DELIVERY | Facility: HOSPITAL | Age: 24
End: 2024-01-23
Payer: MEDICAID

## 2024-01-23 ENCOUNTER — ANESTHESIA EVENT (OUTPATIENT)
Dept: LABOR AND DELIVERY | Facility: HOSPITAL | Age: 24
End: 2024-01-23
Payer: MEDICAID

## 2024-01-23 PROBLEM — O14.13 SEVERE PREECLAMPSIA, THIRD TRIMESTER: Status: ACTIVE | Noted: 2024-01-23

## 2024-01-23 PROCEDURE — 25810000003 LACTATED RINGERS PER 1000 ML: Performed by: OBSTETRICS & GYNECOLOGY

## 2024-01-23 PROCEDURE — 99252 IP/OBS CONSLTJ NEW/EST SF 35: CPT

## 2024-01-23 PROCEDURE — 25010000002 MAGNESIUM SULFATE 20 GM/500ML SOLUTION: Performed by: OBSTETRICS & GYNECOLOGY

## 2024-01-23 PROCEDURE — 25010000002 ONDANSETRON PER 1 MG: Performed by: OBSTETRICS & GYNECOLOGY

## 2024-01-23 PROCEDURE — C1755 CATHETER, INTRASPINAL: HCPCS | Performed by: ANESTHESIOLOGY

## 2024-01-23 PROCEDURE — 25010000002 HYDRALAZINE PER 20 MG: Performed by: OBSTETRICS & GYNECOLOGY

## 2024-01-23 RX ORDER — MAGNESIUM SULFATE HEPTAHYDRATE 40 MG/ML
2 INJECTION, SOLUTION INTRAVENOUS CONTINUOUS
Status: DISPENSED | OUTPATIENT
Start: 2024-01-23 | End: 2024-01-26

## 2024-01-23 RX ORDER — FENTANYL 0.2 MG/100ML-BUPIV 0.125%-NACL 0.9% EPIDURAL INJ 2/0.125 MCG/ML-%
SOLUTION INJECTION CONTINUOUS PRN
Status: DISCONTINUED | OUTPATIENT
Start: 2024-01-23 | End: 2024-01-24 | Stop reason: SURG

## 2024-01-23 RX ORDER — LIDOCAINE HCL/EPINEPHRINE/PF 2%-1:200K
VIAL (ML) INJECTION AS NEEDED
Status: DISCONTINUED | OUTPATIENT
Start: 2024-01-23 | End: 2024-01-24 | Stop reason: SURG

## 2024-01-23 RX ORDER — HYDRALAZINE HYDROCHLORIDE 20 MG/ML
10 INJECTION INTRAMUSCULAR; INTRAVENOUS ONCE AS NEEDED
Status: DISCONTINUED | OUTPATIENT
Start: 2024-01-23 | End: 2024-01-24

## 2024-01-23 RX ORDER — HYDRALAZINE HYDROCHLORIDE 20 MG/ML
5 INJECTION INTRAMUSCULAR; INTRAVENOUS ONCE
Status: COMPLETED | OUTPATIENT
Start: 2024-01-23 | End: 2024-01-23

## 2024-01-23 RX ORDER — FENTANYL 0.2 MG/100ML-BUPIV 0.125%-NACL 0.9% EPIDURAL INJ 2/0.125 MCG/ML-%
SOLUTION INJECTION
Status: COMPLETED
Start: 2024-01-23 | End: 2024-01-23

## 2024-01-23 RX ORDER — EPHEDRINE SULFATE 5 MG/ML
10 INJECTION INTRAVENOUS
Status: DISCONTINUED | OUTPATIENT
Start: 2024-01-23 | End: 2024-01-24

## 2024-01-23 RX ORDER — FENTANYL 0.2 MG/100ML-BUPIV 0.125%-NACL 0.9% EPIDURAL INJ 2/0.125 MCG/ML-%
SOLUTION INJECTION CONTINUOUS
Status: DISCONTINUED | OUTPATIENT
Start: 2024-01-23 | End: 2024-01-24

## 2024-01-23 RX ORDER — CALCIUM GLUCONATE 20 MG/ML
1000 INJECTION, SOLUTION INTRAVENOUS AS NEEDED
Status: DISCONTINUED | OUTPATIENT
Start: 2024-01-23 | End: 2024-01-27 | Stop reason: HOSPADM

## 2024-01-23 RX ORDER — SODIUM CHLORIDE, SODIUM LACTATE, POTASSIUM CHLORIDE, CALCIUM CHLORIDE 600; 310; 30; 20 MG/100ML; MG/100ML; MG/100ML; MG/100ML
125 INJECTION, SOLUTION INTRAVENOUS CONTINUOUS
Status: DISPENSED | OUTPATIENT
Start: 2024-01-23 | End: 2024-01-25

## 2024-01-23 RX ORDER — CALCIUM GLUCONATE 94 MG/ML
INJECTION, SOLUTION INTRAVENOUS
Status: ACTIVE
Start: 2024-01-23 | End: 2024-01-23

## 2024-01-23 RX ORDER — ONDANSETRON 2 MG/ML
4 INJECTION INTRAMUSCULAR; INTRAVENOUS EVERY 6 HOURS PRN
Status: DISCONTINUED | OUTPATIENT
Start: 2024-01-23 | End: 2024-01-24 | Stop reason: SDUPTHER

## 2024-01-23 RX ADMIN — Medication: at 18:11

## 2024-01-23 RX ADMIN — LIDOCAINE HYDROCHLORIDE AND EPINEPHRINE 3 ML: 20; 5 INJECTION, SOLUTION EPIDURAL; INFILTRATION; INTRACAUDAL; PERINEURAL at 11:12

## 2024-01-23 RX ADMIN — ACETAMINOPHEN 650 MG: 325 TABLET, FILM COATED ORAL at 15:27

## 2024-01-23 RX ADMIN — MAGNESIUM SULFATE HEPTAHYDRATE 2 G/HR: 40 INJECTION, SOLUTION INTRAVENOUS at 17:08

## 2024-01-23 RX ADMIN — HYDRALAZINE HYDROCHLORIDE 5 MG: 20 INJECTION INTRAMUSCULAR; INTRAVENOUS at 09:03

## 2024-01-23 RX ADMIN — ONDANSETRON 4 MG: 2 INJECTION INTRAMUSCULAR; INTRAVENOUS at 22:03

## 2024-01-23 RX ADMIN — SODIUM CHLORIDE, POTASSIUM CHLORIDE, SODIUM LACTATE AND CALCIUM CHLORIDE 38 ML/HR: 600; 310; 30; 20 INJECTION, SOLUTION INTRAVENOUS at 09:47

## 2024-01-23 RX ADMIN — SODIUM CHLORIDE, POTASSIUM CHLORIDE, SODIUM LACTATE AND CALCIUM CHLORIDE 125 ML/HR: 600; 310; 30; 20 INJECTION, SOLUTION INTRAVENOUS at 01:08

## 2024-01-23 RX ADMIN — Medication 2 MILLI-UNITS/MIN: at 01:08

## 2024-01-23 RX ADMIN — LABETALOL HYDROCHLORIDE 200 MG: 200 TABLET, FILM COATED ORAL at 08:20

## 2024-01-23 RX ADMIN — SODIUM CHLORIDE, POTASSIUM CHLORIDE, SODIUM LACTATE AND CALCIUM CHLORIDE 125 ML/HR: 600; 310; 30; 20 INJECTION, SOLUTION INTRAVENOUS at 17:14

## 2024-01-23 RX ADMIN — LABETALOL HYDROCHLORIDE 200 MG: 200 TABLET, FILM COATED ORAL at 21:18

## 2024-01-23 RX ADMIN — ONDANSETRON 4 MG: 2 INJECTION INTRAMUSCULAR; INTRAVENOUS at 13:25

## 2024-01-23 RX ADMIN — Medication 10 ML/HR: at 11:15

## 2024-01-23 NOTE — CONSULTS
Prenatal Consult     Referring OB:  Dr. Robbins     Reason for Consultation:  potential late  delivery at 36.4 weeks gestation with mom being pre-eclampsia and started on mag     Maternal History:   Kenyetta is a 23 y.o. yr old  with: hypertension (gestational)  Estimated Date of Delivery: 24     Prenatal History, Physical Exam, US and labs were reviewed and pertinent findings as below:  Steroids: None.  US report: Normal     Current Delivery Plan: Vaginal     MATERNAL PRENATAL LABS:       MBT: O positive AB: negative  RUBELLA: Unknown  GBS Culture: Negative  HBsAg: Negative   RPR: Non-Reactive  HIV: Non-Reactive  HEP C Ab: Unknown  HSV Hx: Not done  UDS: Not done     Genetic Testing: Not listed in [facility]      CONSULT  Present for discussion were: father and mother     Concerns voiced by the Parents/Family included:      Anticipated Infant's name: undecided at this time     Discussion topics included:   Discussed implications of maternal preeclampsia and need for  delivery. Also discussed respiratory distress syndrome, transient tachypnea of the , early onset of sepsis, jaundice, and hypoglycemia.     Estimated length of stay: ~ 1-2 weeks     Discussed the importance of providing human milk (mother's or donor).  Discussed policies and procedures for NICU.  Discussed structure and function of NICU providers and unit.      ASSESSMENT  Diagnosis for Consultation: Maternal hypertension (gestational) and preeclampsia with risk for late  delivery at 36.4 weeks gestation.     PLAN  NNP and NICU team will plan to attend delivery if delivers  at <36 weeks or CS <37 weeks, or at request of OB.   resuscitation: full resuscitation           Thank you for allowing us to participate in the care of your patient. Should any further questions or concerns arise please do not hesitate to contact us.     ELLIOTT Arzate-BC  24  09:24 EST        >40 minutes were spent in consultation,  greater than 20% face to face time.

## 2024-01-23 NOTE — PLAN OF CARE
Goal Outcome Evaluation:  Plan of Care Reviewed With: patient, spouse, mother           Outcome Evaluation: Pt laboring with epidural for pain management. VS stable, b/p's wnl. Pt denies any further headache, no visual changes. Will continue to monitor.

## 2024-01-23 NOTE — PLAN OF CARE
Problem: Adult Inpatient Plan of Care  Goal: Plan of Care Review  Outcome: Ongoing, Progressing  Goal: Patient-Specific Goal (Individualized)  Outcome: Ongoing, Progressing  Goal: Absence of Hospital-Acquired Illness or Injury  Outcome: Ongoing, Progressing  Goal: Optimal Comfort and Wellbeing  Outcome: Ongoing, Progressing  Intervention: Provide Person-Centered Care  Recent Flowsheet Documentation  Taken 1/23/2024 0808 by Lesley Kovacs RN  Trust Relationship/Rapport:   choices provided   care explained   questions encouraged   questions answered   thoughts/feelings acknowledged   reassurance provided  Goal: Readiness for Transition of Care  Outcome: Ongoing, Progressing     Problem: Bleeding (Labor)  Goal: Hemostasis  Outcome: Ongoing, Progressing     Problem: Change in Fetal Wellbeing (Labor)  Goal: Stable Fetal Wellbeing  Outcome: Ongoing, Progressing     Problem: Delayed Labor Progression (Labor)  Goal: Effective Progression to Delivery  Outcome: Ongoing, Progressing     Problem: Infection (Labor)  Goal: Absence of Infection Signs and Symptoms  Outcome: Ongoing, Progressing     Problem: Labor Pain (Labor)  Goal: Acceptable Pain Control  Outcome: Ongoing, Progressing     Problem: Uterine Tachysystole (Labor)  Goal: Normal Uterine Contraction Pattern  Outcome: Ongoing, Progressing   Goal Outcome Evaluation:   PT hypertensive prior to labetalol, hydralazine and magnesium administration. Pt reflexes WDL. Pt has no clonus. Pt denies HA, visual disturbances, and epigastric pain. Pt has moderate edema in lower extremities. Pt has been on pitocin titration per protocol, pitocin was 12 and was decreased r/t episode of tachysystole. FHT category I with occasional category II r/t prolonged deceleration or minimal variability. Pt is making steady cervical change. Pt is in agreement with plan of care at this time.

## 2024-01-23 NOTE — PAYOR COMM NOTE
"[facility] POLICY HAS TERMED, [facility] POLICY [id number] IS PRIMARY. PENDING AUTH # [id number].      Kenyetta Valdes (23 y.o. Female) 2000    PATIENT ADMITTED WITH Maternal hypertension (gestational) and preeclampsia with risk for late  delivery at 36.4 weeks gestation AS OF 2024 AT 1039 AND IS STILL LABORING.  PLEASE CLINICALS BELOW.           AUTHORIZATION PENDING:   PLEASE CALL OR FAX DETERMINATION TO CONTACT BELOW. THANK YOU.        Neelam Hernandez RN MSN  /UR  [facility]  [phone] office  [phone] fax  [email or web address]    [facility]  NPI: [id number]  Tax: [id number]            Kenyetta Valdes (23 y.o. Female)       Date of Birth   2000    Social Security Number       Address   [address] [place] [address]    Home Phone   [phone]    MRN   [id number]       Zoroastrianism   None    Marital Status   Single                            Admission Date   24    Admission Type   Elective    Admitting Provider   Meghan Robbins MD    Attending Provider   Meghan Robbins MD    Department, Room/Bed   [facility] LABOR AND DELIVERY, L468/1       Discharge Date       Discharge Disposition       Discharge Destination                                 Attending Provider: Meghan Robbins MD    Allergies: No Known Allergies    Isolation: None   Infection: None   Code Status: CPR    Ht: 165.1 cm (65\")   Wt: 103 kg (226 lb 13.7 oz)    Admission Cmt: None   Principal Problem: Encounter for induction of labor [Z34.90]                   Active Insurance as of 2024       Primary Coverage       Payor Plan Insurance Group Employer/Plan Group    [facility] [place] [facility]        Payor Plan Address Payor Plan Phone Number Payor Plan Fax Number Effective Dates    [address]   2023 - None Entered    [place] [address]         Subscriber Name Subscriber Birth Date Member ID       KENYETTA VALDES " 2000 [id number]                     Emergency Contacts        (Rel.) Home Phone Work Phone Mobile Phone    karis adams (Sister) -- -- [phone]    Thong Tapia (Significant Other) -- -- [phone]          Start   Ordered   01/22/24 1039  Admit To Obstetrics Inpatient  Once     Completed     Diagnosis: Encounter for induction of labor [3271335]  Obstetrics Service: Labor and Delivery  Admitting Physician: MELISSA RIBEIRO [[id number]]               Lesley Kovacs, RN   Registered Nurse  Nursing     Plan of Care     Signed     Date of Service: 01/23/24 1450  Creation Time: 01/23/24 1503     Signed            Problem: Adult Inpatient Plan of Care  Goal: Plan of Care Review  Outcome: Ongoing, Progressing  Goal: Patient-Specific Goal (Individualized)  Outcome: Ongoing, Progressing  Goal: Absence of Hospital-Acquired Illness or Injury  Outcome: Ongoing, Progressing  Goal: Optimal Comfort and Wellbeing  Outcome: Ongoing, Progressing  Intervention: Provide Person-Centered Care  Recent Flowsheet Documentation  Taken 1/23/2024 0808 by Lesley Kovacs, RN  Trust Relationship/Rapport:   choices provided   care explained   questions encouraged   questions answered   thoughts/feelings acknowledged   reassurance provided  Goal: Readiness for Transition of Care  Outcome: Ongoing, Progressing     Problem: Bleeding (Labor)  Goal: Hemostasis  Outcome: Ongoing, Progressing     Problem: Change in Fetal Wellbeing (Labor)  Goal: Stable Fetal Wellbeing  Outcome: Ongoing, Progressing     Problem: Delayed Labor Progression (Labor)  Goal: Effective Progression to Delivery  Outcome: Ongoing, Progressing     Problem: Infection (Labor)  Goal: Absence of Infection Signs and Symptoms  Outcome: Ongoing, Progressing     Problem: Labor Pain (Labor)  Goal: Acceptable Pain Control  Outcome: Ongoing, Progressing     Problem: Uterine Tachysystole (Labor)  Goal: Normal Uterine Contraction Pattern  Outcome: Ongoing,  Progressing   Goal Outcome Evaluation:   PT hypertensive prior to labetalol, hydralazine and magnesium administration. Pt reflexes WDL. Pt has no clonus. Pt denies HA, visual disturbances, and epigastric pain. Pt has moderate edema in lower extremities. Pt has been on pitocin titration per protocol, pitocin was 12 and was decreased r/t episode of tachysystole. FHT category I with occasional category II r/t prolonged deceleration or minimal variability. Pt is making steady cervical change. Pt is in agreement with plan of care at this time.                                  History & Physical        H&P signed by [employer] Onbase, [place] at 24 0827         [Media Unavailable] Scan on 2024 0805 by [employer], [place]: [facility], 2023-2024          Electronically signed by New Onbase, [place] at 24 0827       Physician Progress Notes (all)    No notes of this type exist for this encounter.          Consult Notes (all)        Lesley Leon APRN at 24 1136        Consult Orders    1. Inpatient Neonatology Consult [[id number]] ordered by Meghan Robbins MD at 24 0853                 Prenatal Consult     Referring OB:  Dr. Robbins     Reason for Consultation:  potential late  delivery at 36.4 weeks gestation with mom being pre-eclampsia and started on mag     Maternal History:   Kenyetta is a 23 y.o. yr old  with: hypertension (gestational)  Estimated Date of Delivery: 24     Prenatal History, Physical Exam, US and labs were reviewed and pertinent findings as below:  Steroids: None.  US report: Normal     Current Delivery Plan: Vaginal     MATERNAL PRENATAL LABS:       MBT: O positive AB: negative  RUBELLA: Unknown  GBS Culture: Negative  HBsAg: Negative   RPR: Non-Reactive  HIV: Non-Reactive  HEP C Ab: Unknown  HSV Hx: Not done  UDS: Not done     Genetic Testing: Not listed in PNC      CONSULT  Present for discussion were: father and mother      Concerns voiced by the Parents/Family included:      Anticipated Infant's name: undecided at this time     Discussion topics included:   Discussed implications of maternal preeclampsia and need for  delivery. Also discussed respiratory distress syndrome, transient tachypnea of the , early onset of sepsis, jaundice, and hypoglycemia.     Estimated length of stay: ~ 1-2 weeks     Discussed the importance of providing human milk (mother's or donor).  Discussed policies and procedures for NICU.  Discussed structure and function of NICU providers and unit.      ASSESSMENT  Diagnosis for Consultation: Maternal hypertension (gestational) and preeclampsia with risk for late  delivery at 36.4 weeks gestation.     PLAN  NNP and NICU team will plan to attend delivery if delivers  at <36 weeks or CS <37 weeks, or at request of OB.   resuscitation: full resuscitation           Thank you for allowing us to participate in the care of your patient. Should any further questions or concerns arise please do not hesitate to contact us.     ELLIOTT Arzate-BC  24  09:24 EST        >40 minutes were spent in consultation, greater than 20% face to face time.     Electronically signed by Lesley Leon APRN at 24 4003

## 2024-01-23 NOTE — ANESTHESIA PREPROCEDURE EVALUATION
Anesthesia Evaluation     Patient summary reviewed and Nursing notes reviewed                Airway   Mallampati: II  TM distance: >3 FB  Neck ROM: full  Dental - normal exam     Pulmonary - negative pulmonary ROS and normal exam   Cardiovascular - negative cardio ROS and normal exam        Neuro/Psych- negative ROS  GI/Hepatic/Renal/Endo - negative ROS     Musculoskeletal     (+) neck pain, radiculopathy Right lower extremity  Abdominal  - normal exam   Substance History - negative use     OB/GYN    (+) Pregnant, Preeclampsia        Other - negative ROS                   Anesthesia Plan    ASA 3     epidural       Anesthetic plan, risks, benefits, and alternatives have been provided, discussed and informed consent has been obtained with: patient.  Pre-procedure education provided  Plan discussed with CRNA.    CODE STATUS:    Level Of Support Discussed With: Patient  Code Status (Patient has no pulse and is not breathing): CPR (Attempt to Resuscitate)  Medical Interventions (Patient has pulse or is breathing): Full Support

## 2024-01-23 NOTE — H&P
LLOYD Dobbins  Obstetric History and Physical     Chief Complaint: Elevated BP    Subjective     Patient is a 23 y.o. female  currently at 36w4d, who presents from the office with elevated BP and headache. She has severe range BP in the office. Headache is not resolved with tylenol or rest.     Her prenatal care is c/b above, GHTN with severe range BP, abnormal 1 hr with a normal 3 hr.      Prenatal Information:  External Prenatal Results       Pregnancy Outside Results - Transcribed From Office Records - See Scanned Records For Details       Test Value Date Time    ABO  O  24 1123    Rh  Positive  24 1123    Antibody Screen  Negative  24 1123    Varicella IgG       Rubella       Hgb  12.7 g/dL 24 1123       12.9 g/dL 24 1508    Hct  38.1 % 24 1123       38.6 % 24 1508    Glucose Fasting GTT       Glucose Tolerance Test 1 hour       Glucose Tolerance Test 3 hour       Gonorrhea (discrete)  Not Detected  22    Chlamydia (discrete)  Not Detected  22    RPR       VDRL ^ non reactive  23     Syphilis Antibody       HBsAg ^ Negative  23     Herpes Simplex Virus PCR       Herpes Simplex VIrus Culture       HIV ^ Non-Reactive  23     Hep C RNA Quant PCR       Hep C Antibody       AFP       Group B Strep ^ NEG  24     GBS Susceptibility to Clindamycin       GBS Susceptibility to Erythromycin       Fetal Fibronectin       Genetic Testing, Maternal Blood                 Drug Screening       Test Value Date Time    Urine Drug Screen       Amphetamine Screen       Barbiturate Screen       Benzodiazepine Screen       Methadone Screen       Phencyclidine Screen       Opiates Screen       THC Screen       Cocaine Screen       Propoxyphene Screen       Buprenorphine Screen       Methamphetamine Screen       Oxycodone Screen       Tricyclic Antidepressants Screen                 Legend    ^: Historical                             Past OB History:     none       Past Medical History: Past Medical History:   Diagnosis Date    Known health problems: none         Past Surgical History Past Surgical History:   Procedure Laterality Date    NO PAST SURGERIES          Family History: No family history on file.   Social History:  reports that she has never smoked. She has never used smokeless tobacco.   reports no history of alcohol use.   reports no history of drug use.        General ROS: Pertinent items are noted in HPI    Objective      Vitals:     Vitals:    01/23/24 0631 01/23/24 0700 01/23/24 0800 01/23/24 0815   BP: 121/84 135/94 (!) 162/109 160/98   Pulse: 83 78 83 79   Resp:       Temp:   97.9 °F (36.6 °C)    TempSrc:   Oral    SpO2:       Weight:       Height:           Fetal Heart Rate Assessment:   Cat I    [place]:   Every 2-4 min     Physical Exam:     General Appearance:    Alert, cooperative, in no acute distress   Abdomen:     Soft, non-tender, EFW 6 1/2 lbs   Pelvic Exam:    Presentation: vtx    Cervix: was checked (by me): 2 cm / 75% % / -1 and AROM- Clear, pelvis clinically adequate   Extremities:   Moves all extremities well   Skin:   No bleeding, bruising or rash         Laboratory Results:   Lab Results (last 48 hours)       Procedure Component Value Units Date/Time    T Pallidum Antibody w/ reflex RPR [[id number]]  (Normal) Collected: 01/22/24 1123    Specimen: Blood Updated: 01/22/24 1659     Treponemal AB Total Non-Reactive    HIV-1 Antibody, EIA [[id number]] Resulted: 07/14/23    Specimen: Blood Updated: 01/22/24 1211     External HIV Antibody Non-Reactive    Group B Streptococcus Culture - Swab, Vaginal/Rectum [[id number]] Resulted: 01/18/24    Specimen: Swab from Vaginal/Rectum Updated: 01/22/24 1211     External Strep Group B Ag NEG    External VDRL [[id number]] Resulted: 07/14/23    Specimen: Blood Updated: 01/22/24 1211     VDRL non reactive    Hepatitis B Surface Antigen [[id number]] Resulted: 07/14/23    Specimen: Blood Updated: 01/22/24  1211     External Hepatitis B Surface Ag Negative    Comprehensive Metabolic Panel [[id number]]  (Abnormal) Collected: 01/22/24 1123    Specimen: Blood Updated: 01/22/24 1202     Glucose 70 mg/dL      BUN 7 mg/dL      Creatinine 0.50 mg/dL      Sodium 135 mmol/L      Potassium 4.3 mmol/L      Comment: Slight hemolysis detected by analyzer. Result may be falsely elevated.        Chloride 102 mmol/L      CO2 20.0 mmol/L      Calcium 8.8 mg/dL      Total Protein 6.4 g/dL      Albumin 3.4 g/dL      ALT (SGPT) 11 U/L      AST (SGOT) 19 U/L      Comment: Slight hemolysis detected by analyzer. Result may be falsely elevated.        Alkaline Phosphatase 139 U/L      Total Bilirubin 0.2 mg/dL      Globulin 3.0 gm/dL      A/G Ratio 1.1 g/dL      BUN/Creatinine Ratio 14.0     Anion Gap 13.0 mmol/L      eGFR 137.0 mL/min/1.73     Narrative:      GFR Normal >60  Chronic Kidney Disease <60  Kidney Failure <15      CBC (No Diff) [[id number]]  (Abnormal) Collected: 01/22/24 1123    Specimen: Blood Updated: 01/22/24 1136     WBC 11.80 10*3/mm3      RBC 4.08 10*6/mm3      Hemoglobin 12.7 g/dL      Hematocrit 38.1 %      MCV 93.3 fL      MCH 31.2 pg      MCHC 33.4 g/dL      RDW 13.2 %      RDW-SD 42.9 fl      MPV 8.5 fL      Platelets 275 10*3/mm3                Assessment & Plan     Principal Problem:    Encounter for induction of labor  Active Problems:    Severe preeclampsia, third trimester         Assessment:  1.  Intrauterine pregnancy at 36w4d gestation with reassuring fetal status.    2.  IOL due to GHTN with severe range BP  3.  GBS status:   External Strep Group B Ag   Date Value Ref Range Status   01/18/2024 NEG  Final     4.  FSR    Plan:  1. Vaginal anticipated  2. Magnesium for seizure prophylaxis       Meghan Robbins MD   1/23/2024   08:24 EST

## 2024-01-23 NOTE — ANESTHESIA PROCEDURE NOTES
Labor Epidural    Pre-sedation assessment completed: 1/23/2024 11:00 AM    Patient reassessed immediately prior to procedure    Patient location during procedure: OB  Start Time: 1/23/2024 11:00 AM  Stop Time: 1/23/2024 11:12 AM  Performed By  CRNA/CAA: Chelsea Baker CRNA  Preanesthetic Checklist  Completed: patient identified, IV checked, site marked, risks and benefits discussed, surgical consent, monitors and equipment checked, pre-op evaluation and timeout performed  Prep:  Pt Position:sitting  Sterile Tech:gloves, cap and sterile barrier  Prep:chlorhexidine gluconate and isopropyl alcohol  Monitoring:continuous pulse oximetry, EKG and blood pressure monitoring  Epidural Block Procedure:  Approach:midline  Guidance:landmark technique and palpation technique  Location:L3-L4  Needle Type:Tuohy  Needle Gauge:17 G  Loss of Resistance Medium: saline  Cath Depth at skin:15 cm  Paresthesia: none  Aspiration:negative  Test Dose:negative  Post Assessment:  Dressing:secured with tape, occlusive dressing applied and biopatch applied  Pt Tolerance:patient tolerated the procedure well with no apparent complications  Complications:no

## 2024-01-23 NOTE — PROGRESS NOTES
Prenatal Consult    Referring OB:  Dr. Robbins    Reason for Consultation:  potential late  delivery at 36.4 weeks gestation with mom being pre-eclampsia and started on mag    Maternal History:   Kenyetta is a 23 y.o. yr old  with: hypertension (gestational)  Estimated Date of Delivery: 24    Prenatal History, Physical Exam, US and labs were reviewed and pertinent findings as below:  Steroids: None.  US report: Normal    Current Delivery Plan: Vaginal    MATERNAL PRENATAL LABS:      MBT: O positive AB: negative  RUBELLA: Unknown  GBS Culture: Negative  HBsAg: Negative   RPR: Non-Reactive  HIV: Non-Reactive  HEP C Ab: Unknown  HSV Hx: Not done  UDS: Not done    Genetic Testing: Not listed in PNC     CONSULT  Present for discussion were: father and mother    Concerns voiced by the Parents/Family included:     Anticipated Infant's name: undecided at this time     Discussion topics included:   Discussed implications of maternal preeclampsia and need for  delivery. Also discussed respiratory distress syndrome, transient tachypnea of the , early onset of sepsis, jaundice, and hypoglycemia.    Estimated length of stay: ~ 1-2 weeks    Discussed the importance of providing human milk (mother's or donor).  Discussed policies and procedures for NICU.  Discussed structure and function of NICU providers and unit.     ASSESSMENT  Diagnosis for Consultation: Maternal hypertension (gestational) and preeclampsia with risk for late  delivery at 36.4 weeks gestation.    PLAN  NNP and NICU team will plan to attend delivery if delivers  at <36 weeks or CS <37 weeks, or at request of OB.   resuscitation: full resuscitation        Thank you for allowing us to participate in the care of your patient. Should any further questions or concerns arise please do not hesitate to contact us.    ELLIOTT Arzate-BC  24  09:24 EST      >20 minutes were spent in consultation, greater than 10% face  to face time.

## 2024-01-24 PROCEDURE — 25010000002 HYDROMORPHONE 1 MG/ML SOLUTION: Performed by: ANESTHESIOLOGY

## 2024-01-24 PROCEDURE — 25010000002 KETOROLAC TROMETHAMINE PER 15 MG: Performed by: OBSTETRICS & GYNECOLOGY

## 2024-01-24 PROCEDURE — 25010000002 KETOROLAC TROMETHAMINE PER 15 MG: Performed by: ANESTHESIOLOGY

## 2024-01-24 PROCEDURE — 25810000003 LACTATED RINGERS PER 1000 ML: Performed by: ANESTHESIOLOGY

## 2024-01-24 PROCEDURE — 25010000002 SUCCINYLCHOLINE PER 20 MG: Performed by: ANESTHESIOLOGY

## 2024-01-24 PROCEDURE — 25010000002 ONDANSETRON PER 1 MG: Performed by: ANESTHESIOLOGY

## 2024-01-24 PROCEDURE — 25010000002 CEFAZOLIN PER 500 MG: Performed by: OBSTETRICS & GYNECOLOGY

## 2024-01-24 PROCEDURE — 25010000002 MORPHINE PER 10 MG: Performed by: ANESTHESIOLOGY

## 2024-01-24 PROCEDURE — 25810000003 LACTATED RINGERS PER 1000 ML: Performed by: OBSTETRICS & GYNECOLOGY

## 2024-01-24 PROCEDURE — 25010000002 PROPOFOL 200 MG/20ML EMULSION: Performed by: ANESTHESIOLOGY

## 2024-01-24 PROCEDURE — 88307 TISSUE EXAM BY PATHOLOGIST: CPT | Performed by: OBSTETRICS & GYNECOLOGY

## 2024-01-24 PROCEDURE — 25810000003 LACTATED RINGERS SOLUTION: Performed by: OBSTETRICS & GYNECOLOGY

## 2024-01-24 PROCEDURE — 25010000002 METOCLOPRAMIDE PER 10 MG: Performed by: OBSTETRICS & GYNECOLOGY

## 2024-01-24 PROCEDURE — 25010000002 FENTANYL CITRATE (PF) 100 MCG/2ML SOLUTION: Performed by: ANESTHESIOLOGY

## 2024-01-24 PROCEDURE — 25010000002 CHLOROPROCAINE HCL (PF) 3 % SOLUTION: Performed by: ANESTHESIOLOGY

## 2024-01-24 PROCEDURE — 25010000002 OXYTOCIN PER 10 UNITS: Performed by: ANESTHESIOLOGY

## 2024-01-24 PROCEDURE — 25010000002 MAGNESIUM SULFATE 20 GM/500ML SOLUTION: Performed by: OBSTETRICS & GYNECOLOGY

## 2024-01-24 RX ORDER — KETOROLAC TROMETHAMINE 30 MG/ML
30 INJECTION, SOLUTION INTRAMUSCULAR; INTRAVENOUS ONCE
Status: DISCONTINUED | OUTPATIENT
Start: 2024-01-24 | End: 2024-01-24 | Stop reason: SDUPTHER

## 2024-01-24 RX ORDER — CITRIC ACID/SODIUM CITRATE 334-500MG
30 SOLUTION, ORAL ORAL ONCE
Status: COMPLETED | OUTPATIENT
Start: 2024-01-24 | End: 2024-01-24

## 2024-01-24 RX ORDER — MISOPROSTOL 200 UG/1
800 TABLET ORAL ONCE AS NEEDED
Status: DISCONTINUED | OUTPATIENT
Start: 2024-01-24 | End: 2024-01-25 | Stop reason: HOSPADM

## 2024-01-24 RX ORDER — SUCCINYLCHOLINE CHLORIDE 20 MG/ML
INJECTION INTRAMUSCULAR; INTRAVENOUS AS NEEDED
Status: DISCONTINUED | OUTPATIENT
Start: 2024-01-24 | End: 2024-01-24 | Stop reason: SURG

## 2024-01-24 RX ORDER — OXYCODONE HYDROCHLORIDE 5 MG/1
10 TABLET ORAL EVERY 4 HOURS PRN
Status: DISCONTINUED | OUTPATIENT
Start: 2024-01-24 | End: 2024-01-27 | Stop reason: HOSPADM

## 2024-01-24 RX ORDER — SODIUM CHLORIDE, SODIUM LACTATE, POTASSIUM CHLORIDE, CALCIUM CHLORIDE 600; 310; 30; 20 MG/100ML; MG/100ML; MG/100ML; MG/100ML
INJECTION, SOLUTION INTRAVENOUS CONTINUOUS PRN
Status: DISCONTINUED | OUTPATIENT
Start: 2024-01-24 | End: 2024-01-24 | Stop reason: SURG

## 2024-01-24 RX ORDER — DOCUSATE SODIUM 100 MG/1
100 CAPSULE, LIQUID FILLED ORAL 2 TIMES DAILY PRN
Status: DISCONTINUED | OUTPATIENT
Start: 2024-01-24 | End: 2024-01-27 | Stop reason: HOSPADM

## 2024-01-24 RX ORDER — OXYTOCIN/0.9 % SODIUM CHLORIDE 30/500 ML
999 PLASTIC BAG, INJECTION (ML) INTRAVENOUS ONCE
Status: COMPLETED | OUTPATIENT
Start: 2024-01-24 | End: 2024-01-24

## 2024-01-24 RX ORDER — ONDANSETRON 2 MG/ML
4 INJECTION INTRAMUSCULAR; INTRAVENOUS ONCE AS NEEDED
Status: DISCONTINUED | OUTPATIENT
Start: 2024-01-24 | End: 2024-01-27 | Stop reason: HOSPADM

## 2024-01-24 RX ORDER — IBUPROFEN 600 MG/1
600 TABLET ORAL EVERY 6 HOURS
Status: DISCONTINUED | OUTPATIENT
Start: 2024-01-25 | End: 2024-01-27 | Stop reason: HOSPADM

## 2024-01-24 RX ORDER — ACETAMINOPHEN 500 MG
1000 TABLET ORAL EVERY 6 HOURS
Status: COMPLETED | OUTPATIENT
Start: 2024-01-24 | End: 2024-01-25

## 2024-01-24 RX ORDER — ENOXAPARIN SODIUM 100 MG/ML
40 INJECTION SUBCUTANEOUS NIGHTLY
Status: DISCONTINUED | OUTPATIENT
Start: 2024-01-25 | End: 2024-01-27 | Stop reason: HOSPADM

## 2024-01-24 RX ORDER — OXYTOCIN/0.9 % SODIUM CHLORIDE 30/500 ML
999 PLASTIC BAG, INJECTION (ML) INTRAVENOUS ONCE
Status: DISCONTINUED | OUTPATIENT
Start: 2024-01-24 | End: 2024-01-24 | Stop reason: SDUPTHER

## 2024-01-24 RX ORDER — FAMOTIDINE 10 MG/ML
20 INJECTION, SOLUTION INTRAVENOUS ONCE AS NEEDED
Status: COMPLETED | OUTPATIENT
Start: 2024-01-24 | End: 2024-01-24

## 2024-01-24 RX ORDER — OXYTOCIN 10 [USP'U]/ML
INJECTION, SOLUTION INTRAMUSCULAR; INTRAVENOUS AS NEEDED
Status: DISCONTINUED | OUTPATIENT
Start: 2024-01-24 | End: 2024-01-24 | Stop reason: SURG

## 2024-01-24 RX ORDER — EPHEDRINE SULFATE 5 MG/ML
INJECTION INTRAVENOUS AS NEEDED
Status: DISCONTINUED | OUTPATIENT
Start: 2024-01-24 | End: 2024-01-24 | Stop reason: SURG

## 2024-01-24 RX ORDER — SODIUM CHLORIDE 9 MG/ML
30 INJECTION, SOLUTION INTRAVENOUS CONTINUOUS PRN
Status: DISCONTINUED | OUTPATIENT
Start: 2024-01-24 | End: 2024-01-27 | Stop reason: HOSPADM

## 2024-01-24 RX ORDER — KETOROLAC TROMETHAMINE 30 MG/ML
INJECTION, SOLUTION INTRAMUSCULAR; INTRAVENOUS AS NEEDED
Status: DISCONTINUED | OUTPATIENT
Start: 2024-01-24 | End: 2024-01-24 | Stop reason: SURG

## 2024-01-24 RX ORDER — CHLOROPROCAINE HYDROCHLORIDE 30 MG/ML
INJECTION, SOLUTION EPIDURAL; INFILTRATION; INTRACAUDAL; PERINEURAL AS NEEDED
Status: DISCONTINUED | OUTPATIENT
Start: 2024-01-24 | End: 2024-01-24 | Stop reason: SURG

## 2024-01-24 RX ORDER — PROPOFOL 10 MG/ML
INJECTION, EMULSION INTRAVENOUS AS NEEDED
Status: DISCONTINUED | OUTPATIENT
Start: 2024-01-24 | End: 2024-01-24 | Stop reason: SURG

## 2024-01-24 RX ORDER — OXYCODONE HYDROCHLORIDE 5 MG/1
5 TABLET ORAL EVERY 4 HOURS PRN
Status: DISCONTINUED | OUTPATIENT
Start: 2024-01-24 | End: 2024-01-27 | Stop reason: HOSPADM

## 2024-01-24 RX ORDER — OXYTOCIN/0.9 % SODIUM CHLORIDE 30/500 ML
250 PLASTIC BAG, INJECTION (ML) INTRAVENOUS CONTINUOUS
Status: DISCONTINUED | OUTPATIENT
Start: 2024-01-24 | End: 2024-01-24 | Stop reason: SDUPTHER

## 2024-01-24 RX ORDER — MORPHINE SULFATE 1 MG/ML
INJECTION, SOLUTION EPIDURAL; INTRATHECAL; INTRAVENOUS AS NEEDED
Status: DISCONTINUED | OUTPATIENT
Start: 2024-01-24 | End: 2024-01-24 | Stop reason: SURG

## 2024-01-24 RX ORDER — ONDANSETRON 4 MG/1
4 TABLET, ORALLY DISINTEGRATING ORAL EVERY 6 HOURS PRN
Status: DISCONTINUED | OUTPATIENT
Start: 2024-01-24 | End: 2024-01-25 | Stop reason: HOSPADM

## 2024-01-24 RX ORDER — MORPHINE SULFATE 1 MG/ML
INJECTION INTRAVENOUS CONTINUOUS
Status: DISPENSED | OUTPATIENT
Start: 2024-01-24 | End: 2024-01-27

## 2024-01-24 RX ORDER — ACETAMINOPHEN 500 MG
1000 TABLET ORAL ONCE
Status: COMPLETED | OUTPATIENT
Start: 2024-01-24 | End: 2024-01-24

## 2024-01-24 RX ORDER — OXYTOCIN/0.9 % SODIUM CHLORIDE 30/500 ML
250 PLASTIC BAG, INJECTION (ML) INTRAVENOUS CONTINUOUS
Status: ACTIVE | OUTPATIENT
Start: 2024-01-24 | End: 2024-01-24

## 2024-01-24 RX ORDER — ACETAMINOPHEN 325 MG/1
650 TABLET ORAL EVERY 6 HOURS
Status: DISCONTINUED | OUTPATIENT
Start: 2024-01-25 | End: 2024-01-27 | Stop reason: HOSPADM

## 2024-01-24 RX ORDER — OXYTOCIN/0.9 % SODIUM CHLORIDE 30/500 ML
125 PLASTIC BAG, INJECTION (ML) INTRAVENOUS CONTINUOUS PRN
Status: DISCONTINUED | OUTPATIENT
Start: 2024-01-24 | End: 2024-01-27 | Stop reason: HOSPADM

## 2024-01-24 RX ORDER — FENTANYL CITRATE 50 UG/ML
50 INJECTION, SOLUTION INTRAMUSCULAR; INTRAVENOUS
Status: DISCONTINUED | OUTPATIENT
Start: 2024-01-24 | End: 2024-01-27 | Stop reason: HOSPADM

## 2024-01-24 RX ORDER — ONDANSETRON 2 MG/ML
INJECTION INTRAMUSCULAR; INTRAVENOUS AS NEEDED
Status: DISCONTINUED | OUTPATIENT
Start: 2024-01-24 | End: 2024-01-24 | Stop reason: SURG

## 2024-01-24 RX ORDER — SIMETHICONE 80 MG
80 TABLET,CHEWABLE ORAL 4 TIMES DAILY PRN
Status: DISCONTINUED | OUTPATIENT
Start: 2024-01-24 | End: 2024-01-27 | Stop reason: HOSPADM

## 2024-01-24 RX ORDER — TRANEXAMIC ACID 10 MG/ML
1000 INJECTION, SOLUTION INTRAVENOUS ONCE AS NEEDED
Status: DISCONTINUED | OUTPATIENT
Start: 2024-01-24 | End: 2024-01-25 | Stop reason: HOSPADM

## 2024-01-24 RX ORDER — FENTANYL CITRATE 50 UG/ML
INJECTION, SOLUTION INTRAMUSCULAR; INTRAVENOUS AS NEEDED
Status: DISCONTINUED | OUTPATIENT
Start: 2024-01-24 | End: 2024-01-24 | Stop reason: SURG

## 2024-01-24 RX ORDER — KETOROLAC TROMETHAMINE 30 MG/ML
15 INJECTION, SOLUTION INTRAMUSCULAR; INTRAVENOUS EVERY 6 HOURS
Status: COMPLETED | OUTPATIENT
Start: 2024-01-24 | End: 2024-01-25

## 2024-01-24 RX ORDER — MISOPROSTOL 200 UG/1
800 TABLET ORAL ONCE AS NEEDED
Status: DISCONTINUED | OUTPATIENT
Start: 2024-01-24 | End: 2024-01-24 | Stop reason: SDUPTHER

## 2024-01-24 RX ORDER — CARBOPROST TROMETHAMINE 250 UG/ML
250 INJECTION, SOLUTION INTRAMUSCULAR
Status: DISCONTINUED | OUTPATIENT
Start: 2024-01-24 | End: 2024-01-24 | Stop reason: SDUPTHER

## 2024-01-24 RX ORDER — CARBOPROST TROMETHAMINE 250 UG/ML
250 INJECTION, SOLUTION INTRAMUSCULAR
Status: DISCONTINUED | OUTPATIENT
Start: 2024-01-24 | End: 2024-01-25 | Stop reason: HOSPADM

## 2024-01-24 RX ORDER — METOCLOPRAMIDE HYDROCHLORIDE 5 MG/ML
10 INJECTION INTRAMUSCULAR; INTRAVENOUS ONCE AS NEEDED
Status: COMPLETED | OUTPATIENT
Start: 2024-01-24 | End: 2024-01-24

## 2024-01-24 RX ORDER — ONDANSETRON 2 MG/ML
4 INJECTION INTRAMUSCULAR; INTRAVENOUS EVERY 6 HOURS PRN
Status: DISCONTINUED | OUTPATIENT
Start: 2024-01-24 | End: 2024-01-25 | Stop reason: HOSPADM

## 2024-01-24 RX ORDER — NALOXONE HCL 0.4 MG/ML
0.1 VIAL (ML) INJECTION
Status: DISCONTINUED | OUTPATIENT
Start: 2024-01-24 | End: 2024-01-27 | Stop reason: HOSPADM

## 2024-01-24 RX ORDER — METHYLERGONOVINE MALEATE 0.2 MG/ML
200 INJECTION INTRAVENOUS ONCE AS NEEDED
Status: DISCONTINUED | OUTPATIENT
Start: 2024-01-24 | End: 2024-01-24 | Stop reason: SDUPTHER

## 2024-01-24 RX ORDER — METHYLERGONOVINE MALEATE 0.2 MG/ML
200 INJECTION INTRAVENOUS ONCE AS NEEDED
Status: DISCONTINUED | OUTPATIENT
Start: 2024-01-24 | End: 2024-01-25 | Stop reason: HOSPADM

## 2024-01-24 RX ADMIN — KETOROLAC TROMETHAMINE 15 MG: 30 INJECTION INTRAMUSCULAR; INTRAVENOUS at 23:03

## 2024-01-24 RX ADMIN — ACETAMINOPHEN 1000 MG: 500 TABLET ORAL at 02:47

## 2024-01-24 RX ADMIN — CHLOROPROCAINE HYDROCHLORIDE 5 ML: 30 INJECTION, SOLUTION EPIDURAL; INFILTRATION; INTRACAUDAL; PERINEURAL at 03:03

## 2024-01-24 RX ADMIN — SODIUM CHLORIDE, POTASSIUM CHLORIDE, SODIUM LACTATE AND CALCIUM CHLORIDE 75 ML/HR: 600; 310; 30; 20 INJECTION, SOLUTION INTRAVENOUS at 05:24

## 2024-01-24 RX ADMIN — SODIUM CHLORIDE, SODIUM LACTATE, POTASSIUM CHLORIDE, AND CALCIUM CHLORIDE: .6; .31; .03; .02 INJECTION, SOLUTION INTRAVENOUS at 03:03

## 2024-01-24 RX ADMIN — Medication 250 ML/HR: at 05:05

## 2024-01-24 RX ADMIN — FAMOTIDINE 20 MG: 10 INJECTION INTRAVENOUS at 02:47

## 2024-01-24 RX ADMIN — FENTANYL CITRATE 100 MCG: 50 INJECTION, SOLUTION INTRAMUSCULAR; INTRAVENOUS at 03:58

## 2024-01-24 RX ADMIN — HYDROMORPHONE HYDROCHLORIDE 0.5 MG: 1 INJECTION, SOLUTION INTRAMUSCULAR; INTRAVENOUS; SUBCUTANEOUS at 05:25

## 2024-01-24 RX ADMIN — MORPHINE SULFATE 2 MG: 1 INJECTION EPIDURAL; INTRATHECAL; INTRAVENOUS at 04:17

## 2024-01-24 RX ADMIN — SODIUM CHLORIDE, POTASSIUM CHLORIDE, SODIUM LACTATE AND CALCIUM CHLORIDE 1000 ML: 600; 310; 30; 20 INJECTION, SOLUTION INTRAVENOUS at 02:52

## 2024-01-24 RX ADMIN — MORPHINE SULFATE 2 MG: 1 INJECTION EPIDURAL; INTRATHECAL; INTRAVENOUS at 04:07

## 2024-01-24 RX ADMIN — SODIUM CHLORIDE 2000 MG: 900 INJECTION INTRAVENOUS at 02:46

## 2024-01-24 RX ADMIN — SUCCINYLCHOLINE CHLORIDE 120 MG: 20 INJECTION, SOLUTION INTRAMUSCULAR; INTRAVENOUS at 03:52

## 2024-01-24 RX ADMIN — Medication 200 MCG: at 01:53

## 2024-01-24 RX ADMIN — LABETALOL HYDROCHLORIDE 200 MG: 200 TABLET, FILM COATED ORAL at 21:04

## 2024-01-24 RX ADMIN — SODIUM CITRATE AND CITRIC ACID MONOHYDRATE 30 ML: 500; 334 SOLUTION ORAL at 02:47

## 2024-01-24 RX ADMIN — KETOROLAC TROMETHAMINE 30 MG: 30 INJECTION, SOLUTION INTRAMUSCULAR; INTRAVENOUS at 04:25

## 2024-01-24 RX ADMIN — MAGNESIUM SULFATE HEPTAHYDRATE 2 G/HR: 40 INJECTION, SOLUTION INTRAVENOUS at 15:16

## 2024-01-24 RX ADMIN — PROPOFOL 50 MG: 10 INJECTION, EMULSION INTRAVENOUS at 04:17

## 2024-01-24 RX ADMIN — METOCLOPRAMIDE 10 MG: 5 INJECTION, SOLUTION INTRAMUSCULAR; INTRAVENOUS at 02:47

## 2024-01-24 RX ADMIN — SODIUM CHLORIDE, POTASSIUM CHLORIDE, SODIUM LACTATE AND CALCIUM CHLORIDE 125 ML/HR: 600; 310; 30; 20 INJECTION, SOLUTION INTRAVENOUS at 23:05

## 2024-01-24 RX ADMIN — PROPOFOL 150 MG: 10 INJECTION, EMULSION INTRAVENOUS at 03:52

## 2024-01-24 RX ADMIN — EPHEDRINE SULFATE 10 MG: 5 INJECTION INTRAVENOUS at 04:11

## 2024-01-24 RX ADMIN — ONDANSETRON 4 MG: 2 INJECTION INTRAMUSCULAR; INTRAVENOUS at 04:25

## 2024-01-24 RX ADMIN — KETOROLAC TROMETHAMINE 15 MG: 30 INJECTION INTRAMUSCULAR; INTRAVENOUS at 12:00

## 2024-01-24 RX ADMIN — OXYTOCIN 40 UNITS: 10 INJECTION, SOLUTION INTRAMUSCULAR; INTRAVENOUS at 03:58

## 2024-01-24 RX ADMIN — LABETALOL HYDROCHLORIDE 200 MG: 200 TABLET, FILM COATED ORAL at 09:09

## 2024-01-24 RX ADMIN — CHLOROPROCAINE HYDROCHLORIDE 10 ML: 30 INJECTION, SOLUTION EPIDURAL; INFILTRATION; INTRACAUDAL; PERINEURAL at 02:59

## 2024-01-24 RX ADMIN — KETOROLAC TROMETHAMINE 15 MG: 30 INJECTION INTRAMUSCULAR; INTRAVENOUS at 17:33

## 2024-01-24 RX ADMIN — ACETAMINOPHEN 1000 MG: 500 TABLET ORAL at 15:06

## 2024-01-24 RX ADMIN — Medication 999 ML/HR: at 04:45

## 2024-01-24 RX ADMIN — ACETAMINOPHEN 1000 MG: 500 TABLET ORAL at 21:04

## 2024-01-24 RX ADMIN — MAGNESIUM SULFATE HEPTAHYDRATE 2 G/HR: 40 INJECTION, SOLUTION INTRAVENOUS at 02:21

## 2024-01-24 RX ADMIN — ACETAMINOPHEN 1000 MG: 500 TABLET ORAL at 09:09

## 2024-01-24 NOTE — PLAN OF CARE
Goal Outcome Evaluation:      Patient doing well throughout day. Pain well controlled with scheduled meds. VS and bleeding stable. Output adequate and patient up to ambulate ad bhupendra. Breastfeeding and pumping q 2-3 hrs. Cont to monitor

## 2024-01-24 NOTE — PAYOR COMM NOTE
This is clinical update for Kenyetta Sarah  Reference/Auth#: [id number]     AUTHORIZATION PENDING:     Please call or fax determination to contact below.   Thank you.    CASEY Ross, RN, CCM  Utilization Review Nurse  [facility]  Direct & confidential phone # [phone]  Fax # [phone]  =============   Birth RRG Clinical Indications for Procedure and Care     Overall Determination: Indications Met     Criteria:  [×] Procedure is indicated for  1 or more  of the following :      [×] Induction of labor has failed, [F] as indicated by  1 or more  of the following :          [×] Maternal or fetal indication for  birth develops.          [×] Latent phase of labor has continued for at least 24 hours, and oxytocin has been administered for at least 12 hours after membrane rupture.     Notes:  -- 2024  3:07 PM by Ninoska Miranda RN --      Delivery Record:            Delivery date and time: 24 @ 0356      Gestational age: 36+5 weeks.      /Para/Ab:       Sex: MALE      Birth weight: 2605 grams      Birth length: 19 inches      Apgars: 8/9      Delivery type:         -- 2024  3:07 PM by Ninoska Miranda RN --      IP ORDER . INDUCTION OF LABOR ADMIT ON  FOR GHTN WITH SEVERE FEATURES. PROLONGED LABOR WITH ARREST OF DILATION.  DELIVERY ON 24 @ 0356. 36+5 WKS GESTATION.       Kenyetta Sarah (23 y.o. Female)       Date of Birth   2000    Social Security Number       Address   [address] [place] [address]    Home Phone   [phone]    MRN   [id number]       Yazdanism   None    Marital Status   Single                            Admission Date   24    Admission Type   Elective    Admitting Provider   Meghan Robbins MD    Attending Provider   Meghan Robbins MD    Department, Room/Bed   [facility] LABOR AND DELIVERY, L468/1       Discharge Date       Discharge  "Disposition       Discharge Destination                                 Attending Provider: Meghan Robbins MD    Allergies: No Known Allergies    Isolation: None   Infection: None   Code Status: CPR    Ht: 165.1 cm (65\")   Wt: 103 kg (226 lb 13.7 oz)    Admission Cmt: None   Principal Problem: Encounter for induction of labor [Z34.90]                   Active Insurance as of 2024       Primary Coverage       Payor Plan Insurance Group Employer/Plan Group    [facility] [place] [facility]        Payor Plan Address Payor Plan Phone Number Payor Plan Fax Number Effective Dates    [address]   2023 - None Entered    [facility] [id number]         Subscriber Name Subscriber Birth Date Member ID       SELENA VALDES 2000 [id number]                     Emergency Contacts        (Rel.) Home Phone Work Phone Mobile Phone    karis adams (Sister) -- -- [phone]    WillieThong (Significant Other) -- -- [phone]                 History & Physical        Meghan Robbins MD at 24 0824          [facility]  Obstetric History and Physical     Chief Complaint: Elevated BP    Subjective    Patient is a 23 y.o. female  currently at 36w4d, who presents from the office with elevated BP and headache. She has severe range BP in the office. Headache is not resolved with tylenol or rest.     Her prenatal care is c/b above, [place] with severe range BP, abnormal 1 hr with a normal 3 hr.      Prenatal Information:  External Prenatal Results       Pregnancy Outside Results - Transcribed From Office Records - See Scanned Records For Details       Test Value Date Time    ABO  O  24 1123    Rh  Positive  24 1123    Antibody Screen  Negative  24 1123    Varicella IgG       Rubella       Hgb  12.7 g/dL 24 1123       12.9 g/dL 24 1508    Hct  38.1 % 24 1123       38.6 % 24 1508    Glucose Fasting GTT       Glucose Tolerance Test 1 hour  "      Glucose Tolerance Test 3 hour       Gonorrhea (discrete)  Not Detected  01/17/22 2217    Chlamydia (discrete)  Not Detected  01/17/22 2217    RPR       VDRL ^ non reactive  07/14/23     Syphilis Antibody       HBsAg ^ Negative  07/14/23     Herpes Simplex Virus PCR       Herpes Simplex VIrus Culture       HIV ^ Non-Reactive  07/14/23     Hep C RNA Quant PCR       Hep C Antibody       AFP       Group B Strep ^ NEG  01/18/24     GBS Susceptibility to Clindamycin       GBS Susceptibility to Erythromycin       Fetal Fibronectin       Genetic Testing, Maternal Blood                 Drug Screening       Test Value Date Time    Urine Drug Screen       Amphetamine Screen       Barbiturate Screen       Benzodiazepine Screen       Methadone Screen       Phencyclidine Screen       Opiates Screen       THC Screen       Cocaine Screen       Propoxyphene Screen       Buprenorphine Screen       Methamphetamine Screen       Oxycodone Screen       Tricyclic Antidepressants Screen                 Legend    ^: Historical                             Past OB History:    none       Past Medical History: Past Medical History:   Diagnosis Date    Known health problems: none         Past Surgical History Past Surgical History:   Procedure Laterality Date    NO PAST SURGERIES          Family History: No family history on file.   Social History:  reports that she has never smoked. She has never used smokeless tobacco.   reports no history of alcohol use.   reports no history of drug use.        General ROS: Pertinent items are noted in HPI    Objective     Vitals:     Vitals:    01/23/24 0631 01/23/24 0700 01/23/24 0800 01/23/24 0815   BP: 121/84 135/94 (!) 162/109 160/98   Pulse: 83 78 83 79   Resp:       Temp:   97.9 °F (36.6 °C)    TempSrc:   Oral    SpO2:       Weight:       Height:           Fetal Heart Rate Assessment:   Cat I    [place]:   Every 2-4 min     Physical Exam:     General Appearance:    Alert, cooperative, in no acute  distress   Abdomen:     Soft, non-tender, EFW 6 1/2 lbs   Pelvic Exam:    Presentation: vtx    Cervix: was checked (by me): 2 cm / 75% % / -1 and AROM- Clear, pelvis clinically adequate   Extremities:   Moves all extremities well   Skin:   No bleeding, bruising or rash         Laboratory Results:   Lab Results (last 48 hours)       Procedure Component Value Units Date/Time    T Pallidum Antibody w/ reflex RPR [[id number]]  (Normal) Collected: 01/22/24 1123    Specimen: Blood Updated: 01/22/24 1659     Treponemal AB Total Non-Reactive    HIV-1 Antibody, EIA [[id number]] Resulted: 07/14/23    Specimen: Blood Updated: 01/22/24 1211     External HIV Antibody Non-Reactive    Group B Streptococcus Culture - Swab, Vaginal/Rectum [[id number]] Resulted: 01/18/24    Specimen: Swab from Vaginal/Rectum Updated: 01/22/24 1211     External Strep Group B Ag NEG    External VDRL [[id number]] Resulted: 07/14/23    Specimen: Blood Updated: 01/22/24 1211     VDRL non reactive    Hepatitis B Surface Antigen [[id number]] Resulted: 07/14/23    Specimen: Blood Updated: 01/22/24 1211     External Hepatitis B Surface Ag Negative    Comprehensive Metabolic Panel [[id number]]  (Abnormal) Collected: 01/22/24 1123    Specimen: Blood Updated: 01/22/24 1202     Glucose 70 mg/dL      BUN 7 mg/dL      Creatinine 0.50 mg/dL      Sodium 135 mmol/L      Potassium 4.3 mmol/L      Comment: Slight hemolysis detected by analyzer. Result may be falsely elevated.        Chloride 102 mmol/L      CO2 20.0 mmol/L      Calcium 8.8 mg/dL      Total Protein 6.4 g/dL      Albumin 3.4 g/dL      ALT (SGPT) 11 U/L      AST (SGOT) 19 U/L      Comment: Slight hemolysis detected by analyzer. Result may be falsely elevated.        Alkaline Phosphatase 139 U/L      Total Bilirubin 0.2 mg/dL      Globulin 3.0 gm/dL      A/G Ratio 1.1 g/dL      BUN/Creatinine Ratio 14.0     Anion Gap 13.0 mmol/L      eGFR 137.0 mL/min/1.73     Narrative:      GFR Normal >60  Chronic Kidney  Disease <60  Kidney Failure <15      CBC (No Diff) [[id number]]  (Abnormal) Collected: 24 1123    Specimen: Blood Updated: 24 1136     WBC 11.80 10*3/mm3      RBC 4.08 10*6/mm3      Hemoglobin 12.7 g/dL      Hematocrit 38.1 %      MCV 93.3 fL      MCH 31.2 pg      MCHC 33.4 g/dL      RDW 13.2 %      RDW-SD 42.9 fl      MPV 8.5 fL      Platelets 275 10*3/mm3                Assessment & Plan    Principal Problem:    Encounter for induction of labor  Active Problems:    Severe preeclampsia, third trimester         Assessment:  1.  Intrauterine pregnancy at 36w4d gestation with reassuring fetal status.    2.  IOL due to GHTN with severe range BP  3.  GBS status:   External Strep Group B Ag   Date Value Ref Range Status   2024 NEG  Final     4.  FSR    Plan:  1. Vaginal anticipated  2. Magnesium for seizure prophylaxis       Meghan Robbins MD   2024   08:24 EST      Electronically signed by Meghan Robbins MD at 24 2141       H&P signed by [employer], [place] at 24 0827         [Media Unavailable] Scan on 2024 0805 by [employer], [place]: [facility], 2023-2024          Electronically signed by [employer], [place] at 24 0827          Operative/Procedure Notes (last 72 hours)        Meghan Robbins MD at 24 0354           Mu   Section Operative Note    Preoperative Dx:   1. Patient is a 23 y.o.  at 36w5d    2.  Arrest of dilation  3. GHTN with severe features      Postoperative Dx:    Same  Nuchal cord     Procedure:  Primary    Surgeon:  Assistant:  Meghan Robbins MD   Anesthesia:  Anesthesiologist:  General Anesthesia   Dr Becker     EBL:  800mL     Antibiotics:  cefazolin (Ancef)     Infant    Findings: LVMI  AB.351/-0.4  VB.320/-3.7  Normal appearing uterus, tubes and ovaries       Apgars:    8  @ 1 minute /   9  @ 5 minutes          Procedure Details:     Patient was taken to the  OR where she was prepped and draped in the usual sterile fashion with a catheter and a left tilt.  Anesthesia was found to be adequate.    A Pfannenstiel skin incision was made with the scalpel and carried through to the underlying layer of fascia with the scalpel.  The fascial incision was extended laterally with the Raines scissors and  from the underlying rectus muscles superiorly.  The rectus muscles were  in the midline and the peritoneum was entered bluntly.  The peritoneal incision was extended laterally and the Victor Hugo retractor was placed.  The bladder flap was created sharply.    A low transverse uterine incision was made with the scalpel and extended manually.    The infant's head, shoulders, and body were delivered without difficulty.  Nose and mouth were bulb suctioned.  The cord was clamped and cut.  The infant was handed to awaiting nurse with good cry, color, tone, and movement of all extremities.   Cord gasses and blood were obtained.   Placenta was delivered spontaneously intact with a 3-vessel cord.    The uterus was exteriorized and cleared of all clots and debris.    The uterine incision was repaired with 0 Monocryl in a running, locked fashion and excellent hemostasis was achieved.   The posterior cul-de-sac was wiped clean.   The uterus was returned to the abdomen, the gutters were cleared of all clots and debris.  The uterine incision was examined and was hemostatic.     The peritoneum was reapproximated with 3-0 Monocryl in a running fashion.  The rectus muscles were reapproximated with 0 Monocryl in several simple interrupted sutures.    The fascia was closed with 0 Vicryl in a running fashion.    The subcutaneous space was irrigated and closed with 3-0 Monocryl.    The skin was closed with staples.  Sponge, lap, needle and instrument counts were correct x 2.        Complications:   None      Disposition:   Recovery room then postpartum.          Meghan Robbins,  MD  2024  04:51 EST        Electronically signed by Meghan Robbins MD at 24 0455          Physician Progress Notes (last 72 hours)        Meghan Robbins MD at 24 0237           Mu  Obstetric Progress Note    Subjective   Pushing x 2 hours    Objective     Vitals:  Vitals:    24 2300 24 2301 24 2315 24 2331   BP:  138/84  125/73   BP Location:       Patient Position:       Pulse: 91 97  85   Resp:       Temp: 98.6 °F (37 °C)      TempSrc: Oral      SpO2: 95%  98%    Weight:       Height:           Fetal Heart Rate Assessment:   125, mod variability  [place]:  Every 2-3 min    Physical Exam:  General: Patient is in no acute distress    Pelvic Exam: 10/100/+1            Assessment & Plan     Principal Problem:    Encounter for induction of labor  Active Problems:    Severe preeclampsia, third trimester         Assessment:  1.  Intrauterine pregnancy at 36w5d gestation with reassuring fetal status.    2.  labor  with ROM  3.  GBS status:    External Strep Group B Ag   Date Value Ref Range Status   2024 NEG  Final     4.  FSR    Plan:  1.  Primary  scheduled, minimal to no descent overall since pt started pushing 2 hours ago. Discussed continued pushing vs CS. Pt wants to proceed with CS.  2.  Magnesium for seizure prophylaxis, restart following CS      Meghan Robbins MD  2024  02:37 EST        Electronically signed by Meghan Robbins MD at 24 0246       Meghan Robbins MD at 24           Mu  Obstetric Progress Note    Subjective   Feeling some pressure    Objective     Vitals:  Vitals:    24 1930 24 2030 24 2100   BP: 142/82 126/73 143/96 126/93   BP Location:       Patient Position:       Pulse: 81 91 94 93   Resp:       Temp:   99.1 °F (37.3 °C)    TempSrc:   Oral    SpO2:   99%    Weight:       Height:           Fetal Heart Rate Assessment:   125, mod  variability  [place]:  Every 2-3 min    Physical Exam:  General: Patient is in no acute distress    Pelvic Exam: 9/100/+1            Assessment & Plan     Principal Problem:    Encounter for induction of labor  Active Problems:    Severe preeclampsia, third trimester         Assessment:  1.  Intrauterine pregnancy at 36w4d gestation with reassuring fetal status.    2.  labor  with ROM  3.  GBS status:    External Strep Group B Ag   Date Value Ref Range Status   2024 NEG  Final     4.  FSR    Plan:  1.  Vaginal anticipated  2. Magnesium for seizure prophylaxis      Meghan Robbins MD  2024  21:26 EST        Electronically signed by Meghan Robbins MD at 24 2133          Consult Notes (last 72 hours)        Lesley Leon, APRN at 24 1136        Consult Orders    1. Inpatient Neonatology Consult [[id number]] ordered by Meghan Robbins MD at 24 0870                 Prenatal Consult     Referring OB:  Dr. Robbins     Reason for Consultation:  potential late  delivery at 36.4 weeks gestation with mom being pre-eclampsia and started on mag     Maternal History:   Kenyetta is a 23 y.o. yr old  with: hypertension (gestational)  Estimated Date of Delivery: 24     Prenatal History, Physical Exam, US and labs were reviewed and pertinent findings as below:  Steroids: None.  US report: Normal     Current Delivery Plan: Vaginal     MATERNAL PRENATAL LABS:       MBT: O positive AB: negative  RUBELLA: Unknown  GBS Culture: Negative  HBsAg: Negative   RPR: Non-Reactive  HIV: Non-Reactive  HEP C Ab: Unknown  HSV Hx: Not done  UDS: Not done     Genetic Testing: Not listed in PNC      CONSULT  Present for discussion were: father and mother     Concerns voiced by the Parents/Family included:      Anticipated Infant's name: undecided at this time     Discussion topics included:   Discussed implications of maternal preeclampsia and need for  delivery. Also discussed  respiratory distress syndrome, transient tachypnea of the , early onset of sepsis, jaundice, and hypoglycemia.     Estimated length of stay: ~ 1-2 weeks     Discussed the importance of providing human milk (mother's or donor).  Discussed policies and procedures for NICU.  Discussed structure and function of NICU providers and unit.      ASSESSMENT  Diagnosis for Consultation: Maternal hypertension (gestational) and preeclampsia with risk for late  delivery at 36.4 weeks gestation.     PLAN  NNP and NICU team will plan to attend delivery if delivers  at <36 weeks or CS <37 weeks, or at request of OB.   resuscitation: full resuscitation           Thank you for allowing us to participate in the care of your patient. Should any further questions or concerns arise please do not hesitate to contact us.     ELLIOTT Arzate-BC  24  09:24 EST        >40 minutes were spent in consultation, greater than 20% face to face time.     Electronically signed by Lesley Leon APRN at 24 0977

## 2024-01-24 NOTE — PROGRESS NOTES
LLOYD Dobbins  Obstetric Progress Note    Subjective   Feeling some pressure    Objective     Vitals:  Vitals:    01/23/24 1930 01/23/24 2000 01/23/24 2030 01/23/24 2100   BP: 142/82 126/73 143/96 126/93   BP Location:       Patient Position:       Pulse: 81 91 94 93   Resp:       Temp:   99.1 °F (37.3 °C)    TempSrc:   Oral    SpO2:   99%    Weight:       Height:           Fetal Heart Rate Assessment:   125, mod variability  [place]:  Every 2-3 min    Physical Exam:  General: Patient is in no acute distress    Pelvic Exam: 9/100/+1            Assessment & Plan     Principal Problem:    Encounter for induction of labor  Active Problems:    Severe preeclampsia, third trimester         Assessment:  1.  Intrauterine pregnancy at 36w4d gestation with reassuring fetal status.    2.  labor  with ROM  3.  GBS status:    External Strep Group B Ag   Date Value Ref Range Status   01/18/2024 NEG  Final     4.  FSR    Plan:  1.  Vaginal anticipated  2. Magnesium for seizure prophylaxis      Meghan Robbins MD  1/23/2024  21:26 EST

## 2024-01-24 NOTE — OP NOTE
[facility]   Section Operative Note    Preoperative Dx:   1. Patient is a 23 y.o.  at 36w5d    2.  Arrest of dilation  3. GHTN with severe features      Postoperative Dx:    Same  Nuchal cord     Procedure:  Primary    Surgeon:  Assistant:  Meghan Robbins MD   Anesthesia:  Anesthesiologist:  General Anesthesia   Dr Becker     EBL:  800mL     Antibiotics:  cefazolin (Ancef)     Infant    Findings: LVMI  AB.351/-0.4  VB.320/-3.7  Normal appearing uterus, tubes and ovaries       Apgars:    8  @ 1 minute /   9  @ 5 minutes          Procedure Details:     Patient was taken to the OR where she was prepped and draped in the usual sterile fashion with a catheter and a left tilt.  Anesthesia was found to be adequate.    A Pfannenstiel skin incision was made with the scalpel and carried through to the underlying layer of fascia with the scalpel.  The fascial incision was extended laterally with the Raines scissors and  from the underlying rectus muscles superiorly.  The rectus muscles were  in the midline and the peritoneum was entered bluntly.  The peritoneal incision was extended laterally and the Victor Hugo retractor was placed.  The bladder flap was created sharply.    A low transverse uterine incision was made with the scalpel and extended manually.    The infant's head, shoulders, and body were delivered without difficulty.  Nose and mouth were bulb suctioned.  The cord was clamped and cut.  The infant was handed to awaiting nurse with good cry, color, tone, and movement of all extremities.   Cord gasses and blood were obtained.   Placenta was delivered spontaneously intact with a 3-vessel cord.    The uterus was exteriorized and cleared of all clots and debris.    The uterine incision was repaired with 0 Monocryl in a running, locked fashion and excellent hemostasis was achieved.   The posterior cul-de-sac was wiped clean.   The uterus was returned to the abdomen, the gutters  were cleared of all clots and debris.  The uterine incision was examined and was hemostatic.     The peritoneum was reapproximated with 3-0 Monocryl in a running fashion.  The rectus muscles were reapproximated with 0 Monocryl in several simple interrupted sutures.    The fascia was closed with 0 Vicryl in a running fashion.    The subcutaneous space was irrigated and closed with 3-0 Monocryl.    The skin was closed with staples.  Sponge, lap, needle and instrument counts were correct x 2.        Complications:   None      Disposition:   Recovery room then postpartum.          Meghan Robbins MD  1/24/2024  04:51 EST

## 2024-01-24 NOTE — LACTATION NOTE
This note was copied from a baby's chart.  Called to assist mom to feed baby at breast. Positioned in lt football hold, baby nursing well, gentle massage done to breast, expressing colostrum, baby licking.   Rt football hold, nursing well. Will continue doing skin to skin, feed on demand and pump pc. Will call for help as needed.

## 2024-01-24 NOTE — ANESTHESIA POSTPROCEDURE EVALUATION
Patient: Kenyetta Sarah    Procedure Summary       Date: 24 Room / Location: [facility] LABOR DELIVERY  [facility] LABOR DELIVERY    Anesthesia Start: 1100 Anesthesia Stop: 24 0443    Procedure:  SECTION PRIMARY (Abdomen) Diagnosis:       Severe preeclampsia, third trimester      Pregnant and not yet delivered in third trimester      (Severe preeclampsia, third trimester [O14.13])      (Pregnant and not yet delivered in third trimester [Z34.93])    Surgeons: Meghan Robbins MD Provider: Maxx Becker MD    Anesthesia Type: epidural ASA Status: 3            Anesthesia Type: epidural    Vitals  Vitals Value Taken Time   /89 24 0203   Temp 98.6 °F (37 °C) 24 2300   Pulse 92 24 0220   Resp 18 24 1800   SpO2 99 % 24 022   Vitals shown include unfiled device data.        Post Anesthesia Care and Evaluation    Patient location during evaluation: PACU  Patient participation: complete - patient participated  Level of consciousness: awake  Pain scale: See nurse's notes for pain score.  Pain management: adequate    Airway patency: patent  Anesthetic complications: No anesthetic complications  PONV Status: none  Cardiovascular status: acceptable  Respiratory status: acceptable and spontaneous ventilation  Hydration status: acceptable    Comments: Patient seen and examined postoperatively; vital signs stable; SpO2 greater than or equal to 90%; cardiopulmonary status stable; nausea/vomiting adequately controlled; pain adequately controlled; no apparent anesthesia complications; patient discharged from anesthesia care when discharge criteria were met

## 2024-01-24 NOTE — PROGRESS NOTES
LLOYD Dobbins  Obstetric Progress Note    Subjective   Pushing x 2 hours    Objective     Vitals:  Vitals:    24 2300 24 2301 24 2315 24 2331   BP:  138/84  125/73   BP Location:       Patient Position:       Pulse: 91 97  85   Resp:       Temp: 98.6 °F (37 °C)      TempSrc: Oral      SpO2: 95%  98%    Weight:       Height:           Fetal Heart Rate Assessment:   125, mod variability  [place]:  Every 2-3 min    Physical Exam:  General: Patient is in no acute distress    Pelvic Exam: 10/100/+1            Assessment & Plan     Principal Problem:    Encounter for induction of labor  Active Problems:    Severe preeclampsia, third trimester         Assessment:  1.  Intrauterine pregnancy at 36w5d gestation with reassuring fetal status.    2.  labor  with ROM  3.  GBS status:    External Strep Group B Ag   Date Value Ref Range Status   2024 NEG  Final     4.  FSR    Plan:  1.  Primary  scheduled, minimal to no descent overall since pt started pushing 2 hours ago. Discussed continued pushing vs CS. Pt wants to proceed with CS.  2.  Magnesium for seizure prophylaxis, restart following CS      Meghan Robbins MD  2024  02:37 EST

## 2024-01-24 NOTE — LACTATION NOTE
This note was copied from a baby's chart.  Pt denies hx of breast surgery, no allergy to wool or foods. Medela gel patches provided, instructed on use.  She has a Mom Cozy pump at home. Participates in WIC program [facility]. Takes prenatal vitamins. Will stay home with baby.   States she has been able to get baby to nurse, skin to skin encouraged, feed on demand. Has started pumping pc and will do regularly q 3 hrs for added stimulation as baby is being supplemented for hypoglycemia. May feed baby any EBM as she pumps. Teaching done on cleaning of breast pump kit. Will call for help as needed.

## 2024-01-24 NOTE — ANESTHESIA PROCEDURE NOTES
Airway  Urgency: elective    Date/Time: 1/24/2024 3:53 AM  Airway not difficult    General Information and Staff    Patient location during procedure: OB    Indications and Patient Condition  Indications for airway management: airway protection    Preoxygenated: yes  MILS maintained throughout  Mask difficulty assessment: 0 - not attempted    Final Airway Details  Final airway type: endotracheal airway      Successful airway: ETT  Cuffed: yes   Successful intubation technique: direct laryngoscopy, video laryngoscopy and RSI  Facilitating devices/methods: cricoid pressure  Endotracheal tube insertion site: oral  Blade: Lutz  Blade size: 3  ETT size (mm): 6.5  Cormack-Lehane Classification: grade I - full view of glottis  Placement verified by: capnometry   Cuff volume (mL): 8  Measured from: lips  ETT/EBT  to lips (cm): 21  Number of attempts at approach: 1  Assessment: lips, teeth, and gum same as pre-op and atraumatic intubation

## 2024-01-25 LAB
BASOPHILS # BLD AUTO: 0 10*3/MM3 (ref 0–0.2)
BASOPHILS NFR BLD AUTO: 0.1 % (ref 0–1.5)
DEPRECATED RDW RBC AUTO: 46.8 FL (ref 37–54)
EOSINOPHIL # BLD AUTO: 0.1 10*3/MM3 (ref 0–0.4)
EOSINOPHIL NFR BLD AUTO: 0.5 % (ref 0.3–6.2)
ERYTHROCYTE [DISTWIDTH] IN BLOOD BY AUTOMATED COUNT: 13.8 % (ref 12.3–15.4)
HCT VFR BLD AUTO: 27.5 % (ref 34–46.6)
HGB BLD-MCNC: 9.1 G/DL (ref 12–15.9)
LYMPHOCYTES # BLD AUTO: 3.7 10*3/MM3 (ref 0.7–3.1)
LYMPHOCYTES NFR BLD AUTO: 23.4 % (ref 19.6–45.3)
MCH RBC QN AUTO: 30.7 PG (ref 26.6–33)
MCHC RBC AUTO-ENTMCNC: 33.1 G/DL (ref 31.5–35.7)
MCV RBC AUTO: 92.9 FL (ref 79–97)
MONOCYTES # BLD AUTO: 0.8 10*3/MM3 (ref 0.1–0.9)
MONOCYTES NFR BLD AUTO: 5 % (ref 5–12)
NEUTROPHILS NFR BLD AUTO: 11.1 10*3/MM3 (ref 1.7–7)
NEUTROPHILS NFR BLD AUTO: 71 % (ref 42.7–76)
NRBC BLD AUTO-RTO: 0.1 /100 WBC (ref 0–0.2)
PLATELET # BLD AUTO: 249 10*3/MM3 (ref 140–450)
PMV BLD AUTO: 7.9 FL (ref 6–12)
RBC # BLD AUTO: 2.96 10*6/MM3 (ref 3.77–5.28)
WBC NRBC COR # BLD AUTO: 15.6 10*3/MM3 (ref 3.4–10.8)

## 2024-01-25 PROCEDURE — 97161 PT EVAL LOW COMPLEX 20 MIN: CPT | Performed by: PHYSICAL THERAPIST

## 2024-01-25 PROCEDURE — 85025 COMPLETE CBC W/AUTO DIFF WBC: CPT | Performed by: OBSTETRICS & GYNECOLOGY

## 2024-01-25 PROCEDURE — 25010000002 KETOROLAC TROMETHAMINE PER 15 MG: Performed by: OBSTETRICS & GYNECOLOGY

## 2024-01-25 PROCEDURE — 25010000002 ENOXAPARIN PER 10 MG: Performed by: OBSTETRICS & GYNECOLOGY

## 2024-01-25 RX ADMIN — ACETAMINOPHEN 1000 MG: 500 TABLET ORAL at 03:12

## 2024-01-25 RX ADMIN — ACETAMINOPHEN 650 MG: 325 TABLET, FILM COATED ORAL at 14:50

## 2024-01-25 RX ADMIN — LABETALOL HYDROCHLORIDE 200 MG: 200 TABLET, FILM COATED ORAL at 20:59

## 2024-01-25 RX ADMIN — ACETAMINOPHEN 650 MG: 325 TABLET, FILM COATED ORAL at 20:59

## 2024-01-25 RX ADMIN — ENOXAPARIN SODIUM 40 MG: 100 INJECTION SUBCUTANEOUS at 20:59

## 2024-01-25 RX ADMIN — DOCUSATE SODIUM 100 MG: 100 CAPSULE, LIQUID FILLED ORAL at 08:42

## 2024-01-25 RX ADMIN — KETOROLAC TROMETHAMINE 15 MG: 30 INJECTION INTRAMUSCULAR; INTRAVENOUS at 05:58

## 2024-01-25 RX ADMIN — IBUPROFEN 600 MG: 600 TABLET, FILM COATED ORAL at 12:13

## 2024-01-25 RX ADMIN — LABETALOL HYDROCHLORIDE 200 MG: 200 TABLET, FILM COATED ORAL at 08:42

## 2024-01-25 RX ADMIN — ACETAMINOPHEN 650 MG: 325 TABLET, FILM COATED ORAL at 08:41

## 2024-01-25 RX ADMIN — IBUPROFEN 600 MG: 600 TABLET, FILM COATED ORAL at 17:30

## 2024-01-25 NOTE — PLAN OF CARE
Goal Outcome Evaluation:           Problem: Adult Inpatient Plan of Care  Goal: Plan of Care Review  Outcome: Ongoing, Progressing             Pt has been ambulating to the bathroom independently through the night. Pt voiding adequately. Pain under control with scheduled medications. Magnesium is now turned off and blood pressures are normal. Pt taken to post partum and infant at bedside.

## 2024-01-25 NOTE — THERAPY EVALUATION
Patient Name: Kenyetta Sarah  : 2000    MRN: [id number]                              Today's Date: 2024       Admit Date: 2024    Visit Dx:     ICD-10-CM ICD-9-CM   1. Severe preeclampsia, third trimester  O14.13 642.53   2. Pregnant and not yet delivered in third trimester  Z34.93 V22.1     Patient Active Problem List   Diagnosis    Pregnant and not yet delivered    Encounter for induction of labor    Severe preeclampsia, third trimester     Past Medical History:   Diagnosis Date    Known health problems: none      Past Surgical History:   Procedure Laterality Date     SECTION N/A 2024    Procedure:  SECTION PRIMARY;  Surgeon: Meghan Robbins MD;  Location: [facility] LABOR DELIVERY;  Service: Gynecology;  Laterality: N/A;    NO PAST SURGERIES        General Information       Row Name 24 1417          Physical Therapy Time and Intention    Document Type evaluation  -EJ     Mode of Treatment physical therapy  -EJ       Row Name 24 1417          General Information    Patient Profile Reviewed yes  -EJ     Prior Level of Function independent:  -EJ     Existing Precautions/Restrictions lifting  -EJ     Barriers to Rehab none identified  -EJ       Row Name 24 1417          Living Environment    People in Home significant other  -EJ       Row Name 24 1417          Cognition    Orientation Status (Cognition) oriented x 4  -EJ               User Key  (r) = Recorded By, (t) = Taken By, (c) = Cosigned By      Initials Name Provider Type    EJ Quynh Calabrese, PT Physical Therapist                   Mobility       Row Name 24 1417          Bed Mobility    Bed Mobility bed mobility (all) activities  -EJ     All Activities, Hood (Bed Mobility) modified independence  -EJ       Row Name 24 1417          Sit-Stand Transfer    Sit-Stand Hood (Transfers) modified independence  -EJ       Row Name 24 1417          Gait/Stairs (Locomotion)     Crawford Level (Gait) modified independence  -     Distance in Feet (Gait) Pt up ad bhupendra as tolerated in room and hallway.  -EJ               User Key  (r) = Recorded By, (t) = Taken By, (c) = Cosigned By      Initials Name Provider Type    Quynh Haney, PT Physical Therapist                   Obj/Interventions       Row Name 24 1418          Range of Motion Comprehensive    Comment, General Range of Motion Mild trunk ROM deficits present due to  section.  -EJ       Row Name 24 1418          Strength Comprehensive (MMT)    Comment, General Manual Muscle Testing (MMT) Assessment Pt strength NT this date due to recent delivery.  Pt will likely benefit from pelvic floor/core strengthening/assessment once able.  -EJ               User Key  (r) = Recorded By, (t) = Taken By, (c) = Cosigned By      Initials Name Provider Type    Quynh Haney, PT Physical Therapist                   Goals/Plan    No documentation.                  Clinical Impression       Row Name 24 1419          Pain    Additional Documentation Pain Scale: FACES Pre/Post-Treatment (Group)  -       Row Name 24 1419          Pain Scale: FACES Pre/Post-Treatment    Pain: FACES Scale, Pretreatment 2-->hurts little bit  -EJ     Posttreatment Pain Rating 2-->hurts little bit  -EJ     Pain Location incisional  -EJ     Pain Location - abdomen  -EJ       Row Name 24 1419          Plan of Care Review    Plan of Care Reviewed With patient;significant other  -EJ     Outcome Evaluation Patient is a 22 y/o F,, who came to [facility] 36w5d gestation. She had a  birth 24. During today's evaluation PT provided pt with handout regarding postpartum healing post  birth. Reviewed benefits of abdominal brace, and how long/when to wear during postpartum healing period. She demonstrated good understanding of material after education. Provided pt with handout to keep, and contact information is  present if pt has any additional PT needs/questions while in the hospital or post discharge.  -       Row Name 01/25/24 1419          Therapy Assessment/Plan (PT)    Criteria for Skilled Interventions Met (PT) no  -EJ     Therapy Frequency (PT) evaluation only  -EJ     Predicted Duration of Therapy Intervention (PT) discharge  -       Row Name 01/25/24 1419          Positioning and Restraints    Pre-Treatment Position in bed  -EJ     Post Treatment Position bed  -EJ     In Bed fowlers;call light within reach;with family/caregiver  -               User Key  (r) = Recorded By, (t) = Taken By, (c) = Cosigned By      Initials Name Provider Type    Quynh Haney, PT Physical Therapist                   Outcome Measures       Row Name 01/25/24 1420          How much help from another person do you currently need...    Turning from your back to your side while in flat bed without using bedrails? 4  -EJ     Moving from lying on back to sitting on the side of a flat bed without bedrails? 4  -EJ     Moving to and from a bed to a chair (including a wheelchair)? 4  -EJ     Standing up from a chair using your arms (e.g., wheelchair, bedside chair)? 4  -EJ     Climbing 3-5 steps with a railing? 4  -EJ     To walk in hospital room? 4  -EJ     AM-PAC 6 Clicks Score (PT) 24  -EJ     Highest Level of Mobility Goal 8 --> Walked 250 feet or more  -       Row Name 01/25/24 1420          Functional Assessment    Outcome Measure Options AM-PAC 6 Clicks Basic Mobility (PT)  -               User Key  (r) = Recorded By, (t) = Taken By, (c) = Cosigned By      Initials Name Provider Type    Quynh Haney, PT Physical Therapist                                 Physical Therapy Education       Title: PT OT SLP Therapies (Done)       Topic: Physical Therapy (Done)       Point: Mobility training (Done)       Learning Progress Summary             Patient Acceptance, E,H, VU by MALIK at 1/25/2024 1421                         Point:  Home exercise program (Done)       Learning Progress Summary             Patient Acceptance, E,H, VU by MALIK at 2024 1421                         Point: Body mechanics (Done)       Learning Progress Summary             Patient Acceptance, E,H, VU by MALIK at 2024 1421                         Point: Precautions (Done)       Learning Progress Summary             Patient Acceptance, E,H, VU by MALIK at 2024 1421                                         User Key       Initials Effective Dates Name Provider Type Discipline     23 -  Quynh Calabrese, PT Physical Therapist PT                  PT Recommendation and Plan     Plan of Care Reviewed With: patient, significant other  Outcome Evaluation: Patient is a 22 y/o F,, who came to [facility] 36w5d gestation. She had a  birth 24. During today's evaluation PT provided pt with handout regarding postpartum healing post  birth. Reviewed benefits of abdominal brace, and how long/when to wear during postpartum healing period. She demonstrated good understanding of material after education. Provided pt with handout to keep, and contact information is present if pt has any additional PT needs/questions while in the hospital or post discharge.     Patient education provided on:   -Body changes after pregnancy  -Pelvic floor musculature, Transversus abdominus muscle  -Diaphragmatic breathing  -Proper kegal performance, as well as importance of relaxation   -DENTON  -Body mechanics   -Proper breathing/pressure management   -Toileting posture   -Benefits of exercise  -Basic/beginning exercise 0-2 weeks, 2-6 weeks, and after 6 weeks  -Postpartum safe stretches   -UI  -  Scar work   -When it may be beneficial to see a Pelvic PT       Time Calculation:         PT Charges       Row Name 24 1421             Time Calculation    Start Time 1140  -EJ      Stop Time 1151  -EJ      Time Calculation (min) 11 min  -EJ      PT Received On  01/25/24  -MALIK         Time Calculation- PT    Total Timed Code Minutes- PT 0 minute(s)  -EJ                User Key  (r) = Recorded By, (t) = Taken By, (c) = Cosigned By      Initials Name Provider Type    Quynh Haney, PT Physical Therapist                  Therapy Charges for Today       Code Description Service Date Service Provider Modifiers Qty    61562709118 HC PT EVAL LOW COMPLEXITY 3 1/25/2024 Quynh Calabrese, PT GP 1            PT G-Codes  Outcome Measure Options: AM-PAC 6 Clicks Basic Mobility (PT)  AM-PAC 6 Clicks Score (PT): 24  PT Discharge Summary  Anticipated Discharge Disposition (PT): home    Quynh Calabrese, PT  1/25/2024

## 2024-01-25 NOTE — PROGRESS NOTES
LLOYD Dobbins  Postpartum Note    Subjective   Postpartum Day 1:  Primary Low Transverse  Section    Patient without complaints. Her pain is well controlled with nonsteroidal anti-inflammatory drugs and Tylenol. She is ambulating well.  Patient describes her bleeding as thin lochia. Pt's bleeding normal for PP period. Pt ambulating and voiding appropriately. +flatus. Denies dizziness or fatigue. Denies CNS s/sx's. BP WNL.     Breastfeeding: infant latching without difficulty without pain.    Objective     Vitals:  Vitals:    24 0200 24 0500 24 0600 24 0834   BP: 129/69 122/59 120/80 128/79   BP Location:    Right arm   Patient Position:    Sitting   Pulse: 81 84 93 86   Resp:    18   Temp:    98 °F (36.7 °C)   TempSrc:    Oral   SpO2:    98%   Weight:   101 kg (221 lb 9.6 oz)    Height:           Physical Exam:  General:  Alert and oriented x3. No acute distress.  Abdomen: abdomen is soft without significant tenderness, masses, organomegaly or guarding. Fundus: appropriate, firm, non tender  Incision: Clean/Dry/Intact and Staples intact  Skin: Warm, Dry  Extremities: Normal,  trace edema. Nontender     Labs:  Results from last 7 days   Lab Units 24  0604 24  1123 24  1508   WBC 10*3/mm3 15.60* 11.80* 12.50*   HEMOGLOBIN g/dL 9.1* 12.7 12.9   HEMATOCRIT % 27.5* 38.1 38.6   PLATELETS 10*3/mm3 249 275 284     Results from last 7 days   Lab Units 24  1123   GLUCOSE mg/dL 70        Feeding method: Breastfeeding Status: Yes     Blood Type: RH Positive        Assessment & Plan     Principal Problem:    Encounter for induction of labor  Active Problems:    Pregnant and not yet delivered    Severe preeclampsia, third trimester      Kenyetta Sarah is Day 1  post-partum from a  Primary Low Transverse  Section      Plan:  routine, continue present management, encourage ambulation, monitor BPs, and monitor pain.  Anemia s/p delivery - asymptomatic; po FeSO4 ordered.         Betty Milian, APRN  1/25/2024  11:41 EST

## 2024-01-25 NOTE — PLAN OF CARE
Goal Outcome Evaluation:  Plan of Care Reviewed With: patient, significant other           Outcome Evaluation: Patient is a 24 y/o F,, who came to [facility] 36w5d gestation. She had a  birth 24. During today's evaluation PT provided pt with handout regarding postpartum healing post  birth. Reviewed benefits of abdominal brace, and how long/when to wear during postpartum healing period. She demonstrated good understanding of material after education. Provided pt with handout to keep, and contact information is present if pt has any additional PT needs/questions while in the hospital or post discharge.     Patient education provided on:   -Body changes after pregnancy  -Pelvic floor musculature, Transversus abdominus muscle  -Diaphragmatic breathing  -Proper kegal performance, as well as importance of relaxation   -DENTON  -Body mechanics   -Proper breathing/pressure management   -Toileting posture   -Benefits of exercise  -Basic/beginning exercise 0-2 weeks, 2-6 weeks, and after 6 weeks  -Postpartum safe stretches   -UI  -  Scar work   -When it may be beneficial to see a Pelvic PT

## 2024-01-25 NOTE — PLAN OF CARE
Goal Outcome Evaluation:           Progress: improving Breastfeeding well. Ambulating without difficulty. Pain controlled with scheduled medication.

## 2024-01-25 NOTE — LACTATION NOTE
This note was copied from a baby's chart.  Pt visited, states she has continued feeding baby at breast, some times better than others, skin to skin done as well. Able to express colostrum manually, has continued pumping some, no yield with pump yet, encouraged to continue pumping for added stimulation. Needed supplies provided.   Microsteamed pump kit parts. Will call for help as needed.

## 2024-01-26 PROCEDURE — 25010000002 ENOXAPARIN PER 10 MG: Performed by: OBSTETRICS & GYNECOLOGY

## 2024-01-26 RX ORDER — FERROUS SULFATE 324(65)MG
324 TABLET, DELAYED RELEASE (ENTERIC COATED) ORAL 2 TIMES DAILY WITH MEALS
Status: DISCONTINUED | OUTPATIENT
Start: 2024-01-26 | End: 2024-01-27 | Stop reason: HOSPADM

## 2024-01-26 RX ADMIN — IBUPROFEN 600 MG: 600 TABLET, FILM COATED ORAL at 05:52

## 2024-01-26 RX ADMIN — ACETAMINOPHEN 650 MG: 325 TABLET, FILM COATED ORAL at 03:08

## 2024-01-26 RX ADMIN — ENOXAPARIN SODIUM 40 MG: 100 INJECTION SUBCUTANEOUS at 21:06

## 2024-01-26 RX ADMIN — IBUPROFEN 600 MG: 600 TABLET, FILM COATED ORAL at 00:17

## 2024-01-26 RX ADMIN — LABETALOL HYDROCHLORIDE 200 MG: 200 TABLET, FILM COATED ORAL at 08:43

## 2024-01-26 RX ADMIN — LABETALOL HYDROCHLORIDE 200 MG: 200 TABLET, FILM COATED ORAL at 21:06

## 2024-01-26 RX ADMIN — IBUPROFEN 600 MG: 600 TABLET, FILM COATED ORAL at 23:45

## 2024-01-26 RX ADMIN — ACETAMINOPHEN 650 MG: 325 TABLET, FILM COATED ORAL at 08:43

## 2024-01-26 RX ADMIN — IBUPROFEN 600 MG: 600 TABLET, FILM COATED ORAL at 18:16

## 2024-01-26 RX ADMIN — FERROUS SULFATE TAB EC 324 MG (65 MG FE EQUIVALENT) 324 MG: 324 (65 FE) TABLET DELAYED RESPONSE at 11:45

## 2024-01-26 RX ADMIN — ACETAMINOPHEN 650 MG: 325 TABLET, FILM COATED ORAL at 21:06

## 2024-01-26 RX ADMIN — IBUPROFEN 600 MG: 600 TABLET, FILM COATED ORAL at 11:45

## 2024-01-26 RX ADMIN — FERROUS SULFATE TAB EC 324 MG (65 MG FE EQUIVALENT) 324 MG: 324 (65 FE) TABLET DELAYED RESPONSE at 18:16

## 2024-01-26 RX ADMIN — ACETAMINOPHEN 650 MG: 325 TABLET, FILM COATED ORAL at 16:06

## 2024-01-26 NOTE — PLAN OF CARE
Goal Outcome Evaluation:           Progress: improving  Outcome Evaluation: Pt is ambulating independently, voiding appropriately. Vitals and bleeding are WNL. Pain is controlled with scheduled medication. Incision shows no s/s of infection. Pt is bonding well with infant, breastfeeding and supplementing with formula.

## 2024-01-26 NOTE — LACTATION NOTE
This note was copied from a baby's chart.  Mother reports she is breastfeeding baby some and pumping some. She only wants to give baby her colostrum and will switch for formula at discharge, discussed. Baby does have a snug frenulum, discussed and provided with information about tongue to read. Mother states she is content doing what she is doing now with feeding baby some colostrum and some formula. I micro-steamed her pump kit and provided with needed supplies. Encouraged to call as needed.

## 2024-01-26 NOTE — PROGRESS NOTES
LLOYD Dobbins  Postpartum Note  f  Subjective   Postpartum Day 2:  Primary Low Transverse  Section    Patient without complaints. Her pain is well controlled with nonsteroidal anti-inflammatory drugs and prescribed pain medications. She is ambulating well.  Patient describes her bleeding as thin lochia.  Patient ambulating without assistance and states passing flatus and voiding appropriately.  Pregnancy complicated by gHTN and BP slightly elevated.  RN reports morning BP prior to morning administration of Labetalol.  Labetalol administered as prescribed and RN to repeat BP.  Patient states h/a yesterday but resolved with Tylenol and denies any CNS sx's at this time.  Mild trace BLE noted.  Lungs clear and no clonus noted.  Hgb 12.7 to 9.1; asymptomatic.  Continue FeSO4.  Continue to monitor BP and s/sx's preE.      Breastfeeding: infant latching with difficulty without pain.    Objective     Vitals:  Vitals:    24 2040 24 2045 24 0006 24 0300   BP:  138/83 121/72 139/84   BP Location:  Right arm Right arm Right arm   Patient Position:  Sitting Lying Lying   Pulse:  78 92 89   Resp:  16 17 17   Temp:  97.8 °F (36.6 °C) 98.3 °F (36.8 °C) 98.6 °F (37 °C)   TempSrc:  Oral Oral Oral   SpO2:  98% 96% 94%   Weight: 98.9 kg (218 lb)      Height:           Physical Exam:  General:  Alert and oriented x3. No acute distress.  Abdomen: abdomen is soft without significant tenderness, masses, organomegaly or guarding. Fundus: appropriate, firm, non tender  Incision: Clean/Dry/Intact and Staples intact  Skin: Warm, Dry  Extremities: Normal,  trace edema. Nontender     Labs:  Results from last 7 days   Lab Units 24  0604 24  1123   WBC 10*3/mm3 15.60* 11.80*   HEMOGLOBIN g/dL 9.1* 12.7   HEMATOCRIT % 27.5* 38.1   PLATELETS 10*3/mm3 249 275     Results from last 7 days   Lab Units 24  1123   GLUCOSE mg/dL 70        Feeding method: Breastfeeding Status: Yes     Blood Type: RH  Positive        Assessment & Plan     Principal Problem:    Encounter for induction of labor  Active Problems:    Pregnant and not yet delivered    Severe preeclampsia, third trimester      Kenyetta Sarah is Day 2  post-partum from a  Primary Low Transverse  Section      Plan:  routine, continue present management, monitor BPs, monitor pain, and plan d/c tomorrow.       Betty Milian, APRN  2024  09:45 EST

## 2024-01-27 ENCOUNTER — DOCUMENTATION (OUTPATIENT)
Dept: OBSTETRICS AND GYNECOLOGY | Facility: HOSPITAL | Age: 24
End: 2024-01-27
Payer: MEDICAID

## 2024-01-27 VITALS
SYSTOLIC BLOOD PRESSURE: 132 MMHG | HEART RATE: 94 BPM | HEIGHT: 65 IN | OXYGEN SATURATION: 96 % | DIASTOLIC BLOOD PRESSURE: 89 MMHG | BODY MASS INDEX: 36.32 KG/M2 | TEMPERATURE: 98 F | WEIGHT: 218 LBS | RESPIRATION RATE: 17 BRPM

## 2024-01-27 PROBLEM — Z98.891 STATUS POST PRIMARY LOW TRANSVERSE CESAREAN SECTION: Status: ACTIVE | Noted: 2024-01-27

## 2024-01-27 PROBLEM — O14.10 PREECLAMPSIA, SEVERE: Status: ACTIVE | Noted: 2024-01-27

## 2024-01-27 RX ORDER — FERROUS SULFATE 324(65)MG
324 TABLET, DELAYED RELEASE (ENTERIC COATED) ORAL 2 TIMES DAILY WITH MEALS
Qty: 30 TABLET | Refills: 1 | Status: SHIPPED | OUTPATIENT
Start: 2024-01-27

## 2024-01-27 RX ORDER — IBUPROFEN 600 MG/1
600 TABLET ORAL EVERY 6 HOURS PRN
Qty: 30 TABLET | Refills: 1 | Status: SHIPPED | OUTPATIENT
Start: 2024-01-27

## 2024-01-27 RX ORDER — ACETAMINOPHEN 325 MG/1
650 TABLET ORAL EVERY 6 HOURS
Qty: 30 TABLET | Refills: 1 | Status: SHIPPED | OUTPATIENT
Start: 2024-01-27

## 2024-01-27 RX ORDER — PSEUDOEPHEDRINE HCL 30 MG
100 TABLET ORAL 2 TIMES DAILY PRN
Qty: 60 EACH | Refills: 1 | Status: SHIPPED | OUTPATIENT
Start: 2024-01-27

## 2024-01-27 RX ORDER — LABETALOL 200 MG/1
200 TABLET, FILM COATED ORAL EVERY 12 HOURS SCHEDULED
Qty: 30 TABLET | Refills: 1 | Status: SHIPPED | OUTPATIENT
Start: 2024-01-27

## 2024-01-27 RX ADMIN — IBUPROFEN 600 MG: 600 TABLET, FILM COATED ORAL at 06:11

## 2024-01-27 RX ADMIN — FERROUS SULFATE TAB EC 324 MG (65 MG FE EQUIVALENT) 324 MG: 324 (65 FE) TABLET DELAYED RESPONSE at 08:27

## 2024-01-27 RX ADMIN — LABETALOL HYDROCHLORIDE 200 MG: 200 TABLET, FILM COATED ORAL at 08:27

## 2024-01-27 RX ADMIN — ACETAMINOPHEN 650 MG: 325 TABLET, FILM COATED ORAL at 08:27

## 2024-01-27 RX ADMIN — IBUPROFEN 600 MG: 600 TABLET, FILM COATED ORAL at 12:02

## 2024-01-27 RX ADMIN — ACETAMINOPHEN 650 MG: 325 TABLET, FILM COATED ORAL at 03:24

## 2024-01-27 NOTE — DISCHARGE SUMMARY
[facility]  Delivery Discharge Summary    Primary OB Clinician: Meghan Robbins MD    Admission Diagnosis:  Principal Problem:    Encounter for induction of labor  Active Problems:    Pregnant and not yet delivered    Severe preeclampsia, third trimester    Status post primary low transverse  section    Preeclampsia, severe      Discharge Diagnosis:  Status post preeclampsia, with IV magnesium sulfate     Gestational Age: 36w5d    Date of Delivery: 2024     Delivery Type: , Low Transverse      Intrapartum Course: Patient presented for IOL, had arrest of dilation, which resulted in PLTCD. See delivery note for details, but uncomplicated CD per op note.     Postpartum Course:  PP period complicated by severe preeclampsia requiring IV mag sulfate. Patient BP management on labetalol 200mg BID.  Pt is tolerating po well, ambulating and voiding without difficulty.  Pain is well controlled, not requiring narcotic therapy. Bleeding is minimal/ appropriate for pp state. Declines contraception at discharge, wants to discuss with primary OB.     Physical Exam:    Vitals:   Vitals:    24 2105 24 2326 24 0339 24 0725   BP: 144/93 125/72 112/73 132/89   BP Location: Right arm Right arm Left arm Right arm   Patient Position: Lying Lying Lying Sitting   Pulse: 74 93 100 94   Resp:  17 16 17   Temp:  98.6 °F (37 °C) 98 °F (36.7 °C) 98 °F (36.7 °C)   TempSrc:  Oral Oral Oral   SpO2:  96% 97% 96%   Weight:       Height:         Temp (24hrs), Av.2 °F (36.8 °C), Min:98 °F (36.7 °C), Max:98.6 °F (37 °C)      General Appearance:    Alert, cooperative, in no acute distress   Abdomen:    Fundus firm below umbilicus, nontender  Incision c/d/I, staples in place will remove at discharge           Extremities: Moves all extremities well, No edema , no cyanosis, no redness.     Labs:   Results from last 7 days   Lab Units 24  0604 24  1123   WBC 10*3/mm3 15.60* 11.80*   HEMOGLOBIN  g/dL 9.1* 12.7   HEMATOCRIT % 27.5* 38.1   PLATELETS 10*3/mm3 249 275     Results from last 7 days   Lab Units 01/22/24  1123   GLUCOSE mg/dL 70         Discharge Medications:     Discharge Medications        New Medications        Instructions Start Date   acetaminophen 325 MG tablet  Commonly known as: TYLENOL   650 mg, Oral, Every 6 Hours      docusate sodium 100 MG capsule   100 mg, Oral, 2 Times Daily PRN      ferrous sulfate 324 (65 Fe) MG tablet delayed-release EC tablet   324 mg, Oral, 2 Times Daily With Meals      ibuprofen 600 MG tablet  Commonly known as: ADVIL,MOTRIN   600 mg, Oral, Every 6 Hours PRN      labetalol 200 MG tablet  Commonly known as: NORMODYNE   200 mg, Oral, Every 12 Hours Scheduled             Continue These Medications        Instructions Start Date   prenatal vitamin 27-0.8 27-0.8 MG tablet tablet   1 tablet, Oral, Daily             Stop These Medications      aspirin 81 MG EC tablet              Feeding method: Breastfeeding Status: Yes    Blood Type: RH Positive      Plan:  Discharge to home.    Follow-up appointment with office in 3 days for BP check and Dr. Meghan Robbins in 6 weeks.  All discharge instructions were reviewed with pt including bleeding warnings, s/sx of pp depression, and warning signs in the pp period for which to seek medical attention including but not limited to s/sx of hypertension and thromboembolism. Given strict precautions for severe preeclampsia, and continue labetalol therapy. Anemia treated with PO iron.

## 2024-01-27 NOTE — LACTATION NOTE
This note was copied from a baby's chart.  Mother states that her milk is coming in. Nipples non-tender. Still unsure how they want to feed the baby: at breast, pump and feed or formula. Initially she planned for formula feed after discharge. Happy her milk is coming in and would consider breastfeeding except for the snug frenulum. Offered to assist, they decline. Plans for discharge today. Discussed first night at home. Provided with  discharge weight ticket and lactation contact card. Encouraged to call as needed.

## 2024-01-27 NOTE — PROGRESS NOTES
18 staples removed from  incision and steri strips applied. Base was clean, dry, and intact and drainage was absent.

## 2024-01-27 NOTE — PLAN OF CARE
Goal Outcome Evaluation:      Patient ambulates and performs self care independently. Patient to be discharged home today in stable condition, with instructions to follow up in OBGYN office in 3 days to check blood pressure. Staples removed and steri strips applied to incision.

## 2024-01-27 NOTE — PLAN OF CARE
Goal Outcome Evaluation:      Mother and baby bonding appropriately. Breastfeeding w/ some assistance, pumping after breastfeeding and achieving ~3-7cc EBM. EBM fed to infant and then supplementing w/ Sim 360. Pain well managed w/ Tylenol and Motrin. Incision staples c/d/I and RADHA. Ambulating in the room independently.

## 2024-01-28 ENCOUNTER — APPOINTMENT (OUTPATIENT)
Dept: CT IMAGING | Facility: HOSPITAL | Age: 24
End: 2024-01-28
Payer: MEDICAID

## 2024-01-28 ENCOUNTER — HOSPITAL ENCOUNTER (INPATIENT)
Facility: HOSPITAL | Age: 24
LOS: 1 days | Discharge: HOME | End: 2024-01-29
Attending: EMERGENCY MEDICINE | Admitting: STUDENT IN AN ORGANIZED HEALTH CARE EDUCATION/TRAINING PROGRAM
Payer: MEDICAID

## 2024-01-28 DIAGNOSIS — O14.90 PRE-ECLAMPSIA, ANTEPARTUM: ICD-10-CM

## 2024-01-28 DIAGNOSIS — R51.9 INTRACTABLE HEADACHE, UNSPECIFIED CHRONICITY PATTERN, UNSPECIFIED HEADACHE TYPE: Primary | ICD-10-CM

## 2024-01-28 LAB
ALBUMIN SERPL-MCNC: 3.4 G/DL (ref 3.5–5.2)
ALBUMIN/GLOB SERPL: 1.1 G/DL
ALP SERPL-CCNC: 117 U/L (ref 39–117)
ALT SERPL W P-5'-P-CCNC: 23 U/L (ref 1–33)
ANION GAP SERPL CALCULATED.3IONS-SCNC: 11 MMOL/L (ref 5–15)
APTT PPP: 25.2 SECONDS (ref 61–76.5)
AST SERPL-CCNC: 25 U/L (ref 1–32)
BASOPHILS # BLD AUTO: 0.1 10*3/MM3 (ref 0–0.2)
BASOPHILS NFR BLD AUTO: 0.6 % (ref 0–1.5)
BILIRUB SERPL-MCNC: 0.3 MG/DL (ref 0–1.2)
BUN SERPL-MCNC: 11 MG/DL (ref 6–20)
BUN/CREAT SERPL: 14.3 (ref 7–25)
CALCIUM SPEC-SCNC: 9.6 MG/DL (ref 8.6–10.5)
CHLORIDE SERPL-SCNC: 107 MMOL/L (ref 98–107)
CO2 SERPL-SCNC: 24 MMOL/L (ref 22–29)
CREAT SERPL-MCNC: 0.77 MG/DL (ref 0.57–1)
DEPRECATED RDW RBC AUTO: 46.4 FL (ref 37–54)
EGFRCR SERPLBLD CKD-EPI 2021: 112.7 ML/MIN/1.73
EOSINOPHIL # BLD AUTO: 0.3 10*3/MM3 (ref 0–0.4)
EOSINOPHIL NFR BLD AUTO: 3.3 % (ref 0.3–6.2)
ERYTHROCYTE [DISTWIDTH] IN BLOOD BY AUTOMATED COUNT: 13.4 % (ref 12.3–15.4)
GLOBULIN UR ELPH-MCNC: 3.2 GM/DL
GLUCOSE BLDC GLUCOMTR-MCNC: 80 MG/DL (ref 70–105)
GLUCOSE SERPL-MCNC: 85 MG/DL (ref 65–99)
HCT VFR BLD AUTO: 30.2 % (ref 34–46.6)
HGB BLD-MCNC: 10.1 G/DL (ref 12–15.9)
INR PPP: <0.93 (ref 0.93–1.1)
LYMPHOCYTES # BLD AUTO: 3.1 10*3/MM3 (ref 0.7–3.1)
LYMPHOCYTES NFR BLD AUTO: 30.1 % (ref 19.6–45.3)
MAGNESIUM SERPL-MCNC: 1.7 MG/DL (ref 1.6–2.6)
MCH RBC QN AUTO: 31.3 PG (ref 26.6–33)
MCHC RBC AUTO-ENTMCNC: 33.5 G/DL (ref 31.5–35.7)
MCV RBC AUTO: 93.5 FL (ref 79–97)
MONOCYTES # BLD AUTO: 0.7 10*3/MM3 (ref 0.1–0.9)
MONOCYTES NFR BLD AUTO: 6.7 % (ref 5–12)
NEUTROPHILS NFR BLD AUTO: 59.3 % (ref 42.7–76)
NEUTROPHILS NFR BLD AUTO: 6.1 10*3/MM3 (ref 1.7–7)
NRBC BLD AUTO-RTO: 0 /100 WBC (ref 0–0.2)
PLATELET # BLD AUTO: 385 10*3/MM3 (ref 140–450)
PMV BLD AUTO: 6.8 FL (ref 6–12)
POTASSIUM SERPL-SCNC: 4.2 MMOL/L (ref 3.5–5.2)
PROT SERPL-MCNC: 6.6 G/DL (ref 6–8.5)
PROTHROMBIN TIME: 10 SECONDS (ref 9.6–11.7)
RBC # BLD AUTO: 3.23 10*6/MM3 (ref 3.77–5.28)
SODIUM SERPL-SCNC: 142 MMOL/L (ref 136–145)
WBC NRBC COR # BLD AUTO: 10.3 10*3/MM3 (ref 3.4–10.8)

## 2024-01-28 PROCEDURE — 83735 ASSAY OF MAGNESIUM: CPT | Performed by: EMERGENCY MEDICINE

## 2024-01-28 PROCEDURE — 25010000002 LABETALOL 5 MG/ML SOLUTION: Performed by: EMERGENCY MEDICINE

## 2024-01-28 PROCEDURE — 70496 CT ANGIOGRAPHY HEAD: CPT

## 2024-01-28 PROCEDURE — 96375 TX/PRO/DX INJ NEW DRUG ADDON: CPT

## 2024-01-28 PROCEDURE — 70450 CT HEAD/BRAIN W/O DYE: CPT

## 2024-01-28 PROCEDURE — 36415 COLL VENOUS BLD VENIPUNCTURE: CPT

## 2024-01-28 PROCEDURE — 85730 THROMBOPLASTIN TIME PARTIAL: CPT | Performed by: EMERGENCY MEDICINE

## 2024-01-28 PROCEDURE — 82948 REAGENT STRIP/BLOOD GLUCOSE: CPT

## 2024-01-28 PROCEDURE — 85025 COMPLETE CBC W/AUTO DIFF WBC: CPT | Performed by: EMERGENCY MEDICINE

## 2024-01-28 PROCEDURE — 85610 PROTHROMBIN TIME: CPT | Performed by: EMERGENCY MEDICINE

## 2024-01-28 PROCEDURE — 80053 COMPREHEN METABOLIC PANEL: CPT | Performed by: EMERGENCY MEDICINE

## 2024-01-28 PROCEDURE — 99285 EMERGENCY DEPT VISIT HI MDM: CPT

## 2024-01-28 PROCEDURE — 25510000001 IOPAMIDOL PER 1 ML: Performed by: EMERGENCY MEDICINE

## 2024-01-28 RX ORDER — FAMOTIDINE 20 MG/1
20 TABLET, FILM COATED ORAL 2 TIMES DAILY PRN
Status: DISCONTINUED | OUTPATIENT
Start: 2024-01-28 | End: 2024-01-29 | Stop reason: HOSPADM

## 2024-01-28 RX ORDER — ONDANSETRON 4 MG/1
8 TABLET, ORALLY DISINTEGRATING ORAL EVERY 8 HOURS PRN
Status: DISCONTINUED | OUTPATIENT
Start: 2024-01-28 | End: 2024-01-29 | Stop reason: HOSPADM

## 2024-01-28 RX ORDER — BISACODYL 10 MG
10 SUPPOSITORY, RECTAL RECTAL DAILY PRN
Status: DISCONTINUED | OUTPATIENT
Start: 2024-01-28 | End: 2024-01-29 | Stop reason: HOSPADM

## 2024-01-28 RX ORDER — SODIUM CHLORIDE 0.9 % (FLUSH) 0.9 %
10 SYRINGE (ML) INJECTION AS NEEDED
Status: DISCONTINUED | OUTPATIENT
Start: 2024-01-28 | End: 2024-01-29 | Stop reason: HOSPADM

## 2024-01-28 RX ORDER — SODIUM CHLORIDE 0.9 % (FLUSH) 0.9 %
10 SYRINGE (ML) INJECTION EVERY 12 HOURS SCHEDULED
Status: DISCONTINUED | OUTPATIENT
Start: 2024-01-28 | End: 2024-01-29 | Stop reason: HOSPADM

## 2024-01-28 RX ORDER — ONDANSETRON 2 MG/ML
4 INJECTION INTRAMUSCULAR; INTRAVENOUS EVERY 8 HOURS PRN
Status: DISCONTINUED | OUTPATIENT
Start: 2024-01-28 | End: 2024-01-29 | Stop reason: HOSPADM

## 2024-01-28 RX ORDER — PRENATAL VIT/IRON FUM/FOLIC AC 27MG-0.8MG
1 TABLET ORAL DAILY
Status: DISCONTINUED | OUTPATIENT
Start: 2024-01-29 | End: 2024-01-29 | Stop reason: HOSPADM

## 2024-01-28 RX ORDER — MAGNESIUM SULFATE HEPTAHYDRATE 40 MG/ML
2 INJECTION, SOLUTION INTRAVENOUS CONTINUOUS
Status: DISCONTINUED | OUTPATIENT
Start: 2024-01-28 | End: 2024-01-29 | Stop reason: HOSPADM

## 2024-01-28 RX ORDER — FERROUS SULFATE 324(65)MG
324 TABLET, DELAYED RELEASE (ENTERIC COATED) ORAL
Status: DISCONTINUED | OUTPATIENT
Start: 2024-01-29 | End: 2024-01-29 | Stop reason: HOSPADM

## 2024-01-28 RX ORDER — ACETAMINOPHEN 325 MG/1
650 TABLET ORAL EVERY 4 HOURS PRN
Status: DISCONTINUED | OUTPATIENT
Start: 2024-01-28 | End: 2024-01-29 | Stop reason: HOSPADM

## 2024-01-28 RX ORDER — LABETALOL 200 MG/1
200 TABLET, FILM COATED ORAL EVERY 12 HOURS SCHEDULED
Status: DISCONTINUED | OUTPATIENT
Start: 2024-01-28 | End: 2024-01-28

## 2024-01-28 RX ORDER — LABETALOL HYDROCHLORIDE 5 MG/ML
20 INJECTION, SOLUTION INTRAVENOUS ONCE
Status: COMPLETED | OUTPATIENT
Start: 2024-01-28 | End: 2024-01-28

## 2024-01-28 RX ORDER — DOCUSATE SODIUM 100 MG/1
100 CAPSULE, LIQUID FILLED ORAL 2 TIMES DAILY PRN
Status: DISCONTINUED | OUTPATIENT
Start: 2024-01-28 | End: 2024-01-29 | Stop reason: HOSPADM

## 2024-01-28 RX ORDER — METOCLOPRAMIDE HYDROCHLORIDE 5 MG/ML
10 INJECTION INTRAMUSCULAR; INTRAVENOUS EVERY 6 HOURS PRN
Status: DISCONTINUED | OUTPATIENT
Start: 2024-01-28 | End: 2024-01-29 | Stop reason: HOSPADM

## 2024-01-28 RX ORDER — LABETALOL 200 MG/1
200 TABLET, FILM COATED ORAL EVERY 12 HOURS SCHEDULED
Status: DISCONTINUED | OUTPATIENT
Start: 2024-01-28 | End: 2024-01-29 | Stop reason: HOSPADM

## 2024-01-28 RX ORDER — CALCIUM GLUCONATE 94 MG/ML
1 INJECTION, SOLUTION INTRAVENOUS ONCE AS NEEDED
Status: DISCONTINUED | OUTPATIENT
Start: 2024-01-28 | End: 2024-01-29 | Stop reason: HOSPADM

## 2024-01-28 RX ORDER — LIDOCAINE HYDROCHLORIDE 10 MG/ML
0.5 INJECTION, SOLUTION EPIDURAL; INFILTRATION; INTRACAUDAL; PERINEURAL ONCE AS NEEDED
Status: DISCONTINUED | OUTPATIENT
Start: 2024-01-28 | End: 2024-01-29 | Stop reason: HOSPADM

## 2024-01-28 RX ORDER — IBUPROFEN 600 MG/1
600 TABLET ORAL EVERY 6 HOURS PRN
Status: DISCONTINUED | OUTPATIENT
Start: 2024-01-28 | End: 2024-01-29 | Stop reason: HOSPADM

## 2024-01-28 RX ORDER — SODIUM CHLORIDE 9 MG/ML
40 INJECTION, SOLUTION INTRAVENOUS AS NEEDED
Status: DISCONTINUED | OUTPATIENT
Start: 2024-01-28 | End: 2024-01-29 | Stop reason: HOSPADM

## 2024-01-28 RX ADMIN — FAMOTIDINE 20 MG: 20 TABLET, FILM COATED ORAL at 22:40

## 2024-01-28 RX ADMIN — Medication 20 MG: at 20:02

## 2024-01-28 RX ADMIN — LABETALOL HYDROCHLORIDE 200 MG: 200 TABLET, FILM COATED ORAL at 22:09

## 2024-01-28 RX ADMIN — IOPAMIDOL 100 ML: 755 INJECTION, SOLUTION INTRAVENOUS at 21:01

## 2024-01-29 VITALS
WEIGHT: 208.34 LBS | HEART RATE: 92 BPM | SYSTOLIC BLOOD PRESSURE: 146 MMHG | RESPIRATION RATE: 16 BRPM | HEIGHT: 64 IN | TEMPERATURE: 98.5 F | BODY MASS INDEX: 35.57 KG/M2 | OXYGEN SATURATION: 98 % | DIASTOLIC BLOOD PRESSURE: 85 MMHG

## 2024-01-29 LAB
ABO GROUP BLD: NORMAL
BLD GP AB SCN SERPL QL: NEGATIVE
LAB AP CASE REPORT: NORMAL
LAB AP DIAGNOSIS COMMENT: NORMAL
PATH REPORT.FINAL DX SPEC: NORMAL
PATH REPORT.GROSS SPEC: NORMAL
RH BLD: POSITIVE
T&S EXPIRATION DATE: NORMAL

## 2024-01-29 PROCEDURE — 25010000002 METOCLOPRAMIDE PER 10 MG: Performed by: STUDENT IN AN ORGANIZED HEALTH CARE EDUCATION/TRAINING PROGRAM

## 2024-01-29 PROCEDURE — 86901 BLOOD TYPING SEROLOGIC RH(D): CPT | Performed by: STUDENT IN AN ORGANIZED HEALTH CARE EDUCATION/TRAINING PROGRAM

## 2024-01-29 PROCEDURE — 86850 RBC ANTIBODY SCREEN: CPT | Performed by: STUDENT IN AN ORGANIZED HEALTH CARE EDUCATION/TRAINING PROGRAM

## 2024-01-29 PROCEDURE — 96366 THER/PROPH/DIAG IV INF ADDON: CPT

## 2024-01-29 PROCEDURE — 96365 THER/PROPH/DIAG IV INF INIT: CPT

## 2024-01-29 PROCEDURE — 86900 BLOOD TYPING SEROLOGIC ABO: CPT | Performed by: STUDENT IN AN ORGANIZED HEALTH CARE EDUCATION/TRAINING PROGRAM

## 2024-01-29 PROCEDURE — 25010000002 MAGNESIUM SULFATE 20 GM/500ML SOLUTION: Performed by: STUDENT IN AN ORGANIZED HEALTH CARE EDUCATION/TRAINING PROGRAM

## 2024-01-29 PROCEDURE — 96375 TX/PRO/DX INJ NEW DRUG ADDON: CPT

## 2024-01-29 PROCEDURE — 99221 1ST HOSP IP/OBS SF/LOW 40: CPT | Performed by: PSYCHIATRY & NEUROLOGY

## 2024-01-29 RX ORDER — SODIUM CHLORIDE, SODIUM LACTATE, POTASSIUM CHLORIDE, CALCIUM CHLORIDE 600; 310; 30; 20 MG/100ML; MG/100ML; MG/100ML; MG/100ML
50 INJECTION, SOLUTION INTRAVENOUS CONTINUOUS
Status: DISCONTINUED | OUTPATIENT
Start: 2024-01-28 | End: 2024-01-29 | Stop reason: HOSPADM

## 2024-01-29 RX ADMIN — METOCLOPRAMIDE 10 MG: 5 INJECTION, SOLUTION INTRAMUSCULAR; INTRAVENOUS at 02:42

## 2024-01-29 RX ADMIN — FERROUS SULFATE TAB EC 324 MG (65 MG FE EQUIVALENT) 324 MG: 324 (65 FE) TABLET DELAYED RESPONSE at 08:09

## 2024-01-29 RX ADMIN — ACETAMINOPHEN 650 MG: 325 TABLET, FILM COATED ORAL at 02:42

## 2024-01-29 RX ADMIN — MAGNESIUM SULFATE HEPTAHYDRATE 2 G/HR: 40 INJECTION, SOLUTION INTRAVENOUS at 06:24

## 2024-01-29 RX ADMIN — LABETALOL HYDROCHLORIDE 200 MG: 200 TABLET, FILM COATED ORAL at 09:42

## 2024-01-29 RX ADMIN — IBUPROFEN 600 MG: 600 TABLET, FILM COATED ORAL at 10:48

## 2024-01-29 RX ADMIN — PRENATAL VITAMINS-IRON FUMARATE 27 MG IRON-FOLIC ACID 0.8 MG TABLET 1 TABLET: at 09:41

## 2024-01-29 NOTE — PLAN OF CARE
Goal Outcome Evaluation:         Patient requesting to home. Dr Robbins evaluated pt and discharged home. BP stable  and denies ha after ibuprofen.  Discharge instructions given and patient verbalized understanding. Pt to call office in am and have follow up bp check

## 2024-01-29 NOTE — ED PROVIDER NOTES
Subjective   History of Present Illness  Chief complaint: Patient is a 23-year-old who is postpartum from  section.  She had preeclampsia during her third trimester.  She was discharged yesterday from the hospital for .  She had an induction that progressed to require .  She was given magnesium here in the hospital.  Yesterday she began developing a headache when that worsened when she got home.  She developed some left facial numbness and some weakness in her left arm.  She feels weak when she pushes off with that arm.  That began yesterday.  Has progressed and persisted here today.  She took her labetalol today.  Presents to the emergency department hypertensive.    Context:    Duration:    Timing:    Severity: Severe    Associated Symptoms:        PCP:  LMP:      Review of Systems   Constitutional:  Negative for fever.   Respiratory: Negative.     Cardiovascular: Negative.    Gastrointestinal: Negative.  Negative for abdominal pain.   Genitourinary: Negative.    Neurological:  Positive for weakness, numbness and headaches.   Psychiatric/Behavioral:  Negative for confusion.        Past Medical History:   Diagnosis Date    Known health problems: none        No Known Allergies    Past Surgical History:   Procedure Laterality Date     SECTION N/A 2024    Procedure:  SECTION PRIMARY;  Surgeon: Meghan Robbins MD;  Location: [facility] LABOR DELIVERY;  Service: Gynecology;  Laterality: N/A;    NO PAST SURGERIES         No family history on file.    Social History     Socioeconomic History    Marital status: Single   Tobacco Use    Smoking status: Never    Smokeless tobacco: Never   Vaping Use    Vaping Use: Former   Substance and Sexual Activity    Alcohol use: Never    Drug use: Never    Sexual activity: Defer           Objective   Physical Exam  Vitals and nursing note reviewed.   Constitutional:       Appearance: Normal appearance.   HENT:      Head: Normocephalic  and atraumatic.   Eyes:      Extraocular Movements: Extraocular movements intact.      Pupils: Pupils are equal, round, and reactive to light.   Cardiovascular:      Rate and Rhythm: Normal rate and regular rhythm.   Pulmonary:      Effort: Pulmonary effort is normal.      Breath sounds: Normal breath sounds.   Abdominal:      Palpations: Abdomen is soft.   Musculoskeletal:      Cervical back: Normal range of motion and neck supple.   Skin:     General: Skin is warm and dry.   Neurological:      Mental Status: She is alert and oriented to person, place, and time.      Comments: Patient does have some altered sensation in the left face.  She has no drift in her extremities.   Psychiatric:         Mood and Affect: Mood normal.         Thought Content: Thought content normal.         Procedures           ED Course  ED Course as of 01/28/24 2054   Sun Jan 28, 2024   2002 Spoke with Dr Arredondo.  He recommends ct head without and ct venogram. [LH]      ED Course User Index  [LH] Jose A Appiah,                                    Results for orders placed or performed during the hospital encounter of 01/28/24   Comprehensive Metabolic Panel    Specimen: Arm, Right; Blood   Result Value Ref Range    Glucose 85 65 - 99 mg/dL    BUN 11 6 - 20 mg/dL    Creatinine 0.77 0.57 - 1.00 mg/dL    Sodium 142 136 - 145 mmol/L    Potassium 4.2 3.5 - 5.2 mmol/L    Chloride 107 98 - 107 mmol/L    CO2 24.0 22.0 - 29.0 mmol/L    Calcium 9.6 8.6 - 10.5 mg/dL    Total Protein 6.6 6.0 - 8.5 g/dL    Albumin 3.4 (L) 3.5 - 5.2 g/dL    ALT (SGPT) 23 1 - 33 U/L    AST (SGOT) 25 1 - 32 U/L    Alkaline Phosphatase 117 39 - 117 U/L    Total Bilirubin 0.3 0.0 - 1.2 mg/dL    Globulin 3.2 gm/dL    A/G Ratio 1.1 g/dL    BUN/Creatinine Ratio 14.3 7.0 - 25.0    Anion Gap 11.0 5.0 - 15.0 mmol/L    eGFR 112.7 >60.0 mL/min/1.73   Protime-INR    Specimen: Arm, Right; Blood   Result Value Ref Range    Protime 10.0 9.6 - 11.7 Seconds    INR <0.93 (L) 0.93  - 1.10   aPTT    Specimen: Arm, Right; Blood   Result Value Ref Range    PTT 25.2 (L) 61.0 - 76.5 seconds   CBC Auto Differential    Specimen: Arm, Right; Blood   Result Value Ref Range    WBC 10.30 3.40 - 10.80 10*3/mm3    RBC 3.23 (L) 3.77 - 5.28 10*6/mm3    Hemoglobin 10.1 (L) 12.0 - 15.9 g/dL    Hematocrit 30.2 (L) 34.0 - 46.6 %    MCV 93.5 79.0 - 97.0 fL    MCH 31.3 26.6 - 33.0 pg    MCHC 33.5 31.5 - 35.7 g/dL    RDW 13.4 12.3 - 15.4 %    RDW-SD 46.4 37.0 - 54.0 fl    MPV 6.8 6.0 - 12.0 fL    Platelets 385 140 - 450 10*3/mm3    Neutrophil % 59.3 42.7 - 76.0 %    Lymphocyte % 30.1 19.6 - 45.3 %    Monocyte % 6.7 5.0 - 12.0 %    Eosinophil % 3.3 0.3 - 6.2 %    Basophil % 0.6 0.0 - 1.5 %    Neutrophils, Absolute 6.10 1.70 - 7.00 10*3/mm3    Lymphocytes, Absolute 3.10 0.70 - 3.10 10*3/mm3    Monocytes, Absolute 0.70 0.10 - 0.90 10*3/mm3    Eosinophils, Absolute 0.30 0.00 - 0.40 10*3/mm3    Basophils, Absolute 0.10 0.00 - 0.20 10*3/mm3    nRBC 0.0 0.0 - 0.2 /100 WBC   Magnesium    Specimen: Arm, Right; Blood   Result Value Ref Range    Magnesium 1.7 1.6 - 2.6 mg/dL   POC Glucose Once    Specimen: Blood   Result Value Ref Range    Glucose 80 70 - 105 mg/dL        CT venogram head    Result Date: 1/28/2024  Impression: No acute intracranial process identified. Venous structures appear grossly unremarkable. No obvious thrombus or filling defect identified. Electronically Signed: Natalie Ch MD  1/28/2024 9:06 PM EST  Workstation ID: [id number]    CT Head Without Contrast    Result Date: 1/28/2024  Impression: No acute intracranial process identified. Electronically Signed: Natalie Ch MD  1/28/2024 9:05 PM EST  Workstation ID: [id number]          Medical Decision Making  Patient was seen evaluated for the above problem    Differential diagnosis includes but is not limited to eclampsia, preeclampsia, dural venous thrombosis, intracerebral hemorrhage,    Patient is hypertensive and did require labetalol here in the  emergency department.  First 20 mg dose got her blood pressure down to 144/93.  She is awake and oriented.  She has not had seizure activity but she does have a new headache with left-sided numbness and some arm tremulous weakness when she pushes off on it.  I discussed with Dr. Arredondo.  This started yesterday.  He recommends CT head without and CT venogram.  I discussed with  who states that magnesium at this point, she has already had it would be of no benefit.  She will admit the patient to the hospital.  CT scan non contrast and ct venogram show no emergent finding.      Amount and/or Complexity of Data Reviewed  Labs: ordered. Decision-making details documented in ED Course.     Details: Labs reviewed by myself  Radiology: ordered and independent interpretation performed.     Details: CTs reviewed by myself    Risk  Prescription drug management.  Decision regarding hospitalization.        Final diagnoses:   None     Postpartum preeclampsia  Uncontrolled hypertension  Headache postpartum  ED Disposition  ED Disposition       None            No follow-up provider specified.       Medication List      No changes were made to your prescriptions during this visit.            Jose A Appiah DO  01/28/24 3966

## 2024-01-29 NOTE — CONSULTS
Primary Care Provider: Provider, No Known     Consult requested by: ER team    Reason for Consultation: Neurological evaluation /headaches, preeclampsia recently diagnosed    History taken from: patient chart family RN    Chief complaint: Headaches       SUBJECTIVE:    History of present illness: Background per H&P: Kenyetta Sarah is a 23 y.o. year old female who was evaluated in room 461 at [facility]    Source of information is the patient of the medical records and my discussions with the ER team    This patient is postpartum, I believe day 5 now.  She had preeclampsia.  She presented yesterday with headaches.  She was evaluated and was going for head CT so I requested CT venogram and it was done and it was fine    The patient is doing very well    No further headaches    No hyperreflexia and the blood pressure is stable    No seizures or mental status changes        As per admitting,  Patient is a 23 y.o. female  currently postpartum from S/P PLTCD POD 4, antepartum and postpartum preeclampsia s/p treatment with IV magnesium sulfate.       Her prenatal care is c/b severe preeclampsia, PLTCD, and anemia.  Patient presented with severe headache, severe range BP, and reports of left sided tingling and weakness. She reports symptoms worsened today. She states she is taking labetalol 200mg meds and last took this morning. But she became concerned when headache became worst, more on the left side, but denies vision changes. She is taking ibuprofen and tylenol at home but could not recall last dose. She does report headache becomes worst with position changes, when sitting, up and going from sitting to standing. Patient did have an attempted spinal prior to CD but failed placement and resulted in general anesthesia.      Prior to presenting to L&D, she did receive one dose of IV labetalol 20mg, and severe range Bps resolved.        - Portions of the above HPI were copied from previous encounters and  edited as appropriate. PMH as detailed below.     Review of Systems   No fever chills rigors or sweats  No weight issues  No sleep problems  HEENT:  No speech problem, vision changes, facial asymmetry or pain, or neck problem  Chest: No chest pain, clubbing, cyanosis, orthopnea palpitations  Pulmonary:  No shortness of air, cough or expectoration  Abdomen:  No swelling/tension, constipation,diarrhea or pain  She is postpartum day 5 I believe  Extremity problems as discussed  No back problem  No psychotic issues  Neurologic issues as discussed  No hematologic, dermatologic or endocrine problems        PATIENT HISTORY:  Past Medical History:   Diagnosis Date    Known health problems: none    ,   Past Surgical History:   Procedure Laterality Date     SECTION N/A 2024    Procedure:  SECTION PRIMARY;  Surgeon: Meghan Robbins MD;  Location: [facility] LABOR DELIVERY;  Service: Gynecology;  Laterality: N/A;    NO PAST SURGERIES     , History reviewed. No pertinent family history.,   Social History     Tobacco Use    Smoking status: Never    Smokeless tobacco: Never   Vaping Use    Vaping Use: Former   Substance Use Topics    Alcohol use: Never    Drug use: Never   ,   Prior to Admission medications    Medication Sig Start Date End Date Taking? Authorizing Provider   acetaminophen (TYLENOL) 325 MG tablet Take 2 tablets by mouth Every 6 (Six) Hours. 24   Luciana Blas MD   docusate sodium 100 MG capsule Take 1 capsule by mouth 2 (Two) Times a Day As Needed for Constipation. 24   Luciana Blas MD   ferrous sulfate 324 (65 Fe) MG tablet delayed-release EC tablet Take 1 tablet by mouth 2 (Two) Times a Day With Meals. 24   Luciana Blas MD   ibuprofen (ADVIL,MOTRIN) 600 MG tablet Take 1 tablet by mouth Every 6 (Six) Hours As Needed for Mild Pain. 24   Luciana Blas MD   labetalol (NORMODYNE) 200 MG tablet Take 1 tablet by mouth Every 12 (Twelve) Hours. 24   Roopa  MD Luciana   Prenatal Vit-Fe Fumarate-FA (prenatal vitamin 27-0.8) 27-0.8 MG tablet tablet Take 1 tablet by mouth Daily.    Provider, MD Robert    Allergies:  Patient has no known allergies.    Current Facility-Administered Medications   Medication Dose Route Frequency Provider Last Rate Last Admin    acetaminophen (TYLENOL) tablet 650 mg  650 mg Oral Q4H PRN Luciana Blas MD   650 mg at 01/29/24 0242    bisacodyl (DULCOLAX) suppository 10 mg  10 mg Rectal Daily PRN Luciana Blas MD        calcium gluconate injection 1 g  1 g Intravenous Once PRN Luciana Blas MD        docusate sodium (COLACE) capsule 100 mg  100 mg Oral BID PRN Luciana Blas MD        famotidine (PEPCID) tablet 20 mg  20 mg Oral BID PRN Luciana Blas MD   20 mg at 01/28/24 2240    ferrous sulfate EC tablet 324 mg  324 mg Oral Daily With Breakfast Luciana Blas MD   324 mg at 01/29/24 0809    ibuprofen (ADVIL,MOTRIN) tablet 600 mg  600 mg Oral Q6H PRN Luciana Blas MD   600 mg at 01/29/24 1048    labetalol (NORMODYNE) tablet 200 mg  200 mg Oral Q12H Luciana Blas MD   200 mg at 01/29/24 0942    lactated ringers infusion  50 mL/hr Intravenous Continuous Luciana Blas MD 50 mL/hr at 01/28/24 2200 50 mL/hr at 01/28/24 2200    lidocaine PF 1% (XYLOCAINE) injection 0.5 mL  0.5 mL Intradermal Once PRN Luciana Blas MD        magnesium sulfate 20 GM/500ML infusion  2 g/hr Intravenous Continuous Luciana Blas MD 50 mL/hr at 01/29/24 0624 2 g/hr at 01/29/24 0624    metoclopramide (REGLAN) injection 10 mg  10 mg Intravenous Q6H PRN Luciana Blas MD   10 mg at 01/29/24 0242    ondansetron ODT (ZOFRAN-ODT) disintegrating tablet 8 mg  8 mg Oral Q8H PRN Luciana Blas MD        Or    ondansetron (ZOFRAN) injection 4 mg  4 mg Intravenous Q8H PRN Luciana Blas MD        prenatal vitamin tablet 1 tablet  1 tablet Oral Daily Luciana Blas MD   1 tablet at 01/29/24 0941    sodium chloride 0.9 % flush 10 mL  10 mL Intravenous PRN  Jose A Appiah DO        sodium chloride 0.9 % flush 10 mL  10 mL Intravenous Q12H Luciana Blas MD        sodium chloride 0.9 % flush 10 mL  10 mL Intravenous PRN Luciana Blas MD        sodium chloride 0.9 % infusion 40 mL  40 mL Intravenous PRN Luciana Blas MD            ________________________________________________________        OBJECTIVE:  Upon today's exam, patient is resting comfortably in bed in no acute distress     Neurologic Exam    The patient is awake and alert and oriented x3     Cranial nerve examination demonstrate:  Full fields of vision to confrontation  Pupils are round, reactive to light and accommodation and size of about 3 mm  No ptosis or nystagmus  Funduscopic examination was not successful  Eye movements are conjugate     Sensation on the face and scalp are normal  Muscles of mastication are normal and symmetric  Muscles of  facial expression are normal and symmetric  Hearing is intact bilaterally  Head turning and shoulder shrugs were unremarkable  Tongue was midline  I could not visualize  oropharynx or uvula     Motor examination:  Normal bulk, tone and strength was 5/5  No fasciculations     Sensory examination:  Intact for soft touch, pain   Romberg was not evaluated     Reflexes:  2/4    No clonus     Coordination:  Normal finger-to-nose to finger, rapid alternating movements and toe to finger     Gait:  Deferred     Toe signs:  Mute    ________________________________________________________   RESULTS REVIEW:    VITAL SIGNS:   Temp:  [97.9 °F (36.6 °C)-98.7 °F (37.1 °C)] 98.7 °F (37.1 °C)  Heart Rate:  [] 90  Resp:  [16-23] 16  BP: (103-178)/() 123/74     LABS:      Lab 01/28/24  1956 01/25/24  0604   WBC 10.30 15.60*   HEMOGLOBIN 10.1* 9.1*   HEMATOCRIT 30.2* 27.5*   PLATELETS 385 249   NEUTROS ABS 6.10 11.10*   LYMPHS ABS 3.10 3.70*   MONOS ABS 0.70 0.80   EOS ABS 0.30 0.10   MCV 93.5 92.9   PROTIME 10.0  --    APTT 25.2*  --          Lab  "01/28/24 1956   SODIUM 142   POTASSIUM 4.2   CHLORIDE 107   CO2 24.0   ANION GAP 11.0   BUN 11   CREATININE 0.77   EGFR 112.7   GLUCOSE 85   CALCIUM 9.6   MAGNESIUM 1.7         Lab 01/28/24 1956   TOTAL PROTEIN 6.6   ALBUMIN 3.4*   GLOBULIN 3.2   ALT (SGPT) 23   AST (SGOT) 25   BILIRUBIN 0.3   ALK PHOS 117         Lab 01/28/24 1956   PROTIME 10.0   INR <0.93*             Lab 01/29/24  0823   ABO TYPING O   RH TYPING Positive   ANTIBODY SCREEN Negative         UA          1/10/2024    09:25   Urinalysis   Squamous Epithelial Cells, UA 0-2    Specific Gravity, UA 1.019    Ketones, UA Negative    Blood, UA Negative    Leukocytes, UA Moderate (2+)    Nitrite, UA Negative    RBC, UA 0-2    WBC, UA 21-50    Bacteria, UA 2+        No results found for: \"TSH\", \"LDL\", \"HGBA1C\", \"MAJQKRYX06\", \"PHENYTOIN\", \"PHENOBARB\", \"VALPROATE\", \"CBMZ\"    IMAGING STUDIES:  CT venogram head    Result Date: 1/28/2024  Impression: No acute intracranial process identified. Venous structures appear grossly unremarkable. No obvious thrombus or filling defect identified. Electronically Signed: Natalie Ch MD  1/28/2024 9:06 PM EST  Workstation ID: [id number]    CT Head Without Contrast    Result Date: 1/28/2024  Impression: No acute intracranial process identified. Electronically Signed: Natalie Ch MD  1/28/2024 9:05 PM EST  Workstation ID: [id number]     I reviewed the patient's new clinical results.    ________________________________________________________     PROBLEM LIST:    Preeclampsia, severe            ASSESSMENT/PLAN:  Headaches  Resolved    No significant hypertension    She had preeclampsia but is doing well right now    No clonus    No seizures    Observe only at this time as her CT venogram was okay    Call if needed      I discussed the patient's findings and my recommendations with patient, family, and nursing staff    Roberto Arredondo MD  01/29/24  13:41 EST        "

## 2024-01-29 NOTE — PLAN OF CARE
Goal Outcome Evaluation:  Plan of Care Reviewed With: patient, mother        Progress: improving  Outcome Evaluation: Patient was admitted this shift for postpartum preeclampsia. Blood pressure has improved with use of labetalol. Magniesum infusion started. Urine output WDL. Patient resting at this time.

## 2024-01-29 NOTE — DISCHARGE SUMMARY
"[facility]  Delivery Discharge Summary    Primary OB Clinician: Luciana Blas MD    Admission Diagnosis:  Principal Problem:    Preeclampsia, severe      Discharge Diagnosis:  ***    Gestational Age: 36w5d    Date of Delivery: 2024     Delivered By:  Meghan Robbins     Delivery Type: , Low Transverse      Intrapartum Course: Uncomplicated delivery.     Postpartum Course:  Patient's postpartum course has been uncomplicated.  Her bleeding is minimal, and her pain is controlled.  She is ambulating and tolerating regular diet.  She is ***-feeding.  She is being discharged home today.  She will follow-up for her postpartum visit.      Physical Exam:    Vitals:   Vitals:    24 1530 24 1600 24 1630 24 1730   BP: 124/70 122/70 130/81 146/85   BP Location:       Patient Position:       Pulse: 86 87 92 92   Resp:       Temp:       TempSrc:       SpO2:       Weight:       Height:         Temp (24hrs), Av.5 °F (36.9 °C), Min:97.9 °F (36.6 °C), Max:98.7 °F (37.1 °C)      General Appearance:    Alert, cooperative, in no acute distress   Abdomen:     Soft non-tender, non-distended, no guarding, no rebound         tenderness.   Extremities:   Moves all extremities well, no edema, no cyanosis, no             redness.   Incision:   ***   Fundus:   Firm, below umbilicus     Feeding method: Breastfeeding Status: No    Blood Type: {RH Blood Type:00326}      Plan:  Discharge to home.    Follow-up appointment in {0-10:21960::\"1\"} {Time; units w/plural:11::\"week\"}.      "

## 2024-01-29 NOTE — PAYOR COMM NOTE
"This is discharge notification for Selena Valdes   Reference/Auth # [id number]   Pt discharged routine to home on 1/27/24.    CASEY Ross, RN, CCM  Utilization Review Nurse  [facility]  Direct & confidential phone # [phone]  Fax # [phone]        Selena Valdes (23 y.o. Female)       Date of Birth   2000    Social Security Number       Address   [address] [place]    Home Phone   [phone]    MRN   [id number]       Quaker   None    Marital Status   Single                            Admission Date   1/22/24    Admission Type   Elective    Admitting Provider   Meghan Robbins MD    Attending Provider       Department, Room/Bed   [facility] MOTHER BABY, M411/1       Discharge Date   1/27/2024    Discharge Disposition   Home or Self Care    Discharge Destination                                 Attending Provider: (none)   Allergies: No Known Allergies    Isolation: None   Infection: None   Code Status: CPR    Ht: 165.1 cm (65\")   Wt: 98.9 kg (218 lb)    Admission Cmt: None   Principal Problem: Encounter for induction of labor [Z34.90]                   Active Insurance as of 1/22/2024       Primary Coverage       Payor Plan Insurance Group Employer/Plan Group    [facility] [place] [facility]        Payor Plan Address Payor Plan Phone Number Payor Plan Fax Number Effective Dates    [address]   12/1/2023 - None Entered    [facility] [address]         Subscriber Name Subscriber Birth Date Member ID       SELENA VALDES 2000 [id number]                     Emergency Contacts        (Rel.) Home Phone Work Phone Mobile Phone    karis adams (Sister) -- -- [phone]    Thong Tapia (Significant Other) -- -- [phone]                 Discharge Summary        Luciana Blas MD at 01/27/24 1308          [facility]  Delivery Discharge Summary    Primary OB Clinician: Meghan Robbins MD    Admission " Diagnosis:  Principal Problem:    Encounter for induction of labor  Active Problems:    Pregnant and not yet delivered    Severe preeclampsia, third trimester    Status post primary low transverse  section    Preeclampsia, severe      Discharge Diagnosis:  Status post preeclampsia, with IV magnesium sulfate     Gestational Age: 36w5d    Date of Delivery: 2024     Delivery Type: , Low Transverse      Intrapartum Course: Patient presented for IOL, had arrest of dilation, which resulted in PLTCD. See delivery note for details, but uncomplicated CD per op note.     Postpartum Course:  PP period complicated by severe preeclampsia requiring IV mag sulfate. Patient BP management on labetalol 200mg BID.  Pt is tolerating po well, ambulating and voiding without difficulty.  Pain is well controlled, not requiring narcotic therapy. Bleeding is minimal/ appropriate for pp state. Declines contraception at discharge, wants to discuss with primary OB.     Physical Exam:    Vitals:   Vitals:    24 2105 24 2326 24 0339 24 0725   BP: 144/93 125/72 112/73 132/89   BP Location: Right arm Right arm Left arm Right arm   Patient Position: Lying Lying Lying Sitting   Pulse: 74 93 100 94   Resp:  17 16 17   Temp:  98.6 °F (37 °C) 98 °F (36.7 °C) 98 °F (36.7 °C)   TempSrc:  Oral Oral Oral   SpO2:  96% 97% 96%   Weight:       Height:         Temp (24hrs), Av.2 °F (36.8 °C), Min:98 °F (36.7 °C), Max:98.6 °F (37 °C)      General Appearance:    Alert, cooperative, in no acute distress   Abdomen:    Fundus firm below umbilicus, nontender  Incision c/d/I, staples in place will remove at discharge           Extremities: Moves all extremities well, No edema , no cyanosis, no redness.     Labs:   Results from last 7 days   Lab Units 24  0604 24  1123   WBC 10*3/mm3 15.60* 11.80*   HEMOGLOBIN g/dL 9.1* 12.7   HEMATOCRIT % 27.5* 38.1   PLATELETS 10*3/mm3 249 275     Results from last 7 days    Lab Units 01/22/24  1123   GLUCOSE mg/dL 70         Discharge Medications:     Discharge Medications        New Medications        Instructions Start Date   acetaminophen 325 MG tablet  Commonly known as: TYLENOL   650 mg, Oral, Every 6 Hours      docusate sodium 100 MG capsule   100 mg, Oral, 2 Times Daily PRN      ferrous sulfate 324 (65 Fe) MG tablet delayed-release EC tablet   324 mg, Oral, 2 Times Daily With Meals      ibuprofen 600 MG tablet  Commonly known as: ADVIL,MOTRIN   600 mg, Oral, Every 6 Hours PRN      labetalol 200 MG tablet  Commonly known as: NORMODYNE   200 mg, Oral, Every 12 Hours Scheduled             Continue These Medications        Instructions Start Date   prenatal vitamin 27-0.8 27-0.8 MG tablet tablet   1 tablet, Oral, Daily             Stop These Medications      aspirin 81 MG EC tablet              Feeding method: Breastfeeding Status: Yes    Blood Type: RH Positive      Plan:  Discharge to home.    Follow-up appointment with office in 3 days for BP check and Dr. Meghan Robbins in 6 weeks.  All discharge instructions were reviewed with pt including bleeding warnings, s/sx of pp depression, and warning signs in the pp period for which to seek medical attention including but not limited to s/sx of hypertension and thromboembolism. Given strict precautions for severe preeclampsia, and continue labetalol therapy. Anemia treated with PO iron.       Electronically signed by Luciana Blas MD at 01/27/24 7559

## 2024-01-29 NOTE — H&P
[facility]  Obstetric History and Physical     Chief Complaint: patient having headache, worsened with position changes, she denies vision changes, but she also reports left sided weaknesses     Subjective     Patient is a 23 y.o. female  currently postpartum from S/P PLTCD POD 4, antepartum and postpartum preeclampsia s/p treatment with IV magnesium sulfate.      Her prenatal care is c/b severe preeclampsia, PLTCD, and anemia.  Patient presented with severe headache, severe range BP, and reports of left sided tingling and weakness. She reports symptoms worsened today. She states she is taking labetalol 200mg meds and last took this morning. But she became concerned when headache became worst, more on the left side, but denies vision changes. She is taking ibuprofen and tylenol at home but could not recall last dose. She does report headache becomes worst with position changes, when sitting, up and going from sitting to standing. Patient did have an attempted spinal prior to CD but failed placement and resulted in general anesthesia.     Prior to presenting to L&D, she did receive one dose of IV labetalol 20mg, and severe range Bps resolved.     Prenatal Information:  See office H&P for full details and labs.      Past OB History:       OB History    Para Term  AB Living   1 1 0 1 0 1   SAB IAB Ectopic Molar Multiple Live Births   0 0 0 0 0 1               Past Medical History: Past Medical History:   Diagnosis Date    Known health problems: none         Past Surgical History Past Surgical History:   Procedure Laterality Date     SECTION N/A 2024    Procedure:  SECTION PRIMARY;  Surgeon: Meghan Robbins MD;  Location: [facility] DELIVERY;  Service: Gynecology;  Laterality: N/A;    NO PAST SURGERIES          Family History: No family history on file.   Social History:  reports that she has never smoked. She has never used smokeless tobacco.   reports no history of  "alcohol use.   reports no history of drug use.        General ROS: Pertinent items are noted in HPI    Objective      Vitals:     Vitals:    01/28/24 1913 01/28/24 2000 01/28/24 2101   BP: 168/96 144/93 142/83   BP Location: Left arm     Patient Position: Sitting     Pulse: 96 85 65   Resp: 16 23 21   Temp: 98.7 °F (37.1 °C)     TempSrc: Oral     SpO2: 99% 97% 97%   Weight: 96.3 kg (212 lb 4.9 oz)     Height: 162.6 cm (64\")              Physical Exam:     General Appearance:    Alert, cooperative, in no acute distress   Abdomen:     Soft, non-tender, no guarding, no rebound tenderness,      Incision c/d/I with steri strips in place   Pelvic Exam:    def   Extremities:   Moves all extremities well, no edema, no cyanosis, no              redness, no clonus, 2+ reflexes   Skin:   No bleeding, bruising or rash   Neurologic:   No focal neurologic defect          Laboratory Results:   Lab Results (last 48 hours)       Procedure Component Value Units Date/Time    Comprehensive Metabolic Panel [[id number]]  (Abnormal) Collected: 01/28/24 1956    Specimen: Blood from Arm, Right Updated: 01/28/24 2024     Glucose 85 mg/dL      BUN 11 mg/dL      Creatinine 0.77 mg/dL      Sodium 142 mmol/L      Potassium 4.2 mmol/L      Chloride 107 mmol/L      CO2 24.0 mmol/L      Calcium 9.6 mg/dL      Total Protein 6.6 g/dL      Albumin 3.4 g/dL      ALT (SGPT) 23 U/L      AST (SGOT) 25 U/L      Alkaline Phosphatase 117 U/L      Total Bilirubin 0.3 mg/dL      Globulin 3.2 gm/dL      A/G Ratio 1.1 g/dL      BUN/Creatinine Ratio 14.3     Anion Gap 11.0 mmol/L      eGFR 112.7 mL/min/1.73     Narrative:      GFR Normal >60  Chronic Kidney Disease <60  Kidney Failure <15      Magnesium [[id number]]  (Normal) Collected: 01/28/24 1956    Specimen: Blood from Arm, Right Updated: 01/28/24 2024     Magnesium 1.7 mg/dL     Protime-INR [[id number]]  (Abnormal) Collected: 01/28/24 1956    Specimen: Blood from Arm, Right Updated: 01/28/24 2019     Protime " 10.0 Seconds      INR <0.93    aPTT [[id number]]  (Abnormal) Collected: 01/28/24 1956    Specimen: Blood from Arm, Right Updated: 01/28/24 2019     PTT 25.2 seconds     CBC & Differential [[id number]]  (Abnormal) Collected: 01/28/24 1956    Specimen: Blood from Arm, Right Updated: 01/28/24 2005    Narrative:      The following orders were created for panel order CBC & Differential.  Procedure                               Abnormality         Status                     ---------                               -----------         ------                     CBC Auto Differential[[id number]]        Abnormal            Final result                 Please view results for these tests on the individual orders.    CBC Auto Differential [[id number]]  (Abnormal) Collected: 01/28/24 1956    Specimen: Blood from Arm, Right Updated: 01/28/24 2005     WBC 10.30 10*3/mm3      RBC 3.23 10*6/mm3      Hemoglobin 10.1 g/dL      Hematocrit 30.2 %      MCV 93.5 fL      MCH 31.3 pg      MCHC 33.5 g/dL      RDW 13.4 %      RDW-SD 46.4 fl      MPV 6.8 fL      Platelets 385 10*3/mm3      Neutrophil % 59.3 %      Lymphocyte % 30.1 %      Monocyte % 6.7 %      Eosinophil % 3.3 %      Basophil % 0.6 %      Neutrophils, Absolute 6.10 10*3/mm3      Lymphocytes, Absolute 3.10 10*3/mm3      Monocytes, Absolute 0.70 10*3/mm3      Eosinophils, Absolute 0.30 10*3/mm3      Basophils, Absolute 0.10 10*3/mm3      nRBC 0.0 /100 WBC     POC Glucose Once [[id number]]  (Normal) Collected: 01/28/24 1915    Specimen: Blood Updated: 01/28/24 1917     Glucose 80 mg/dL      Comment: Serial Number: [id number]:  [id number]                  Assessment & Plan     Principal Problem:    Preeclampsia, severe         Assessment:  1.  POD 4 s/p PLTCD, with headache, severe range BP, and left sided weakness, admitted for observation   2.  Severe preeclampsia, plan to repeat dose of IV magnesium  3.  Headache: could be complicated by hypertensive disease vs spinal  headache    Plan:  1. Severe preeclampsia:   Repeat labs, all wnl, no signs of severe features besides headache and severe range BP  Due to sx described above, plan to reinitiate IV magnesium   Plan for strict BP monitoring, and strict I/Os   Continue scheduled labetalol 200mg BID, likely will need increase dose of labetalol therapy but will hold off changing pending BP monitoring, consider cardiology consult in AM   Consider discontinuing IV magnesium sulfate 12-24 hours    Headache:   Could be associated with hypertensive disease, but patient at high risk for spinal headache   Plan for tylenol, ibuprofen, reglan, and fioricet as needed for headache management  If sx still worsening and not improved with medical management, will recommend anesthesia consultation for assessment for possible blood patch     2. Plan of care has been reviewed with patient.  3.  Risks, benefits of treatment plan have been discussed.  4.  All questions have been answered.  5. Postpartum: medications ordered for pain control, breast pump ordered          Luciana Blas MD   1/28/2024   21:32 EST

## 2024-01-29 NOTE — PAYOR COMM NOTE
"Selena Valdes (23 y.o. Female)       Date of Birth   2000    Social Security Number       Address   [address] [place] [place] [facility]    Home Phone   [phone]    MRN   [id number]       Muslim   None    Marital Status   Single                            Admission Date   24    Admission Type   Emergency    Admitting Provider   Luciana Blas MD    Attending Provider   Meghan Robbins MD    Department, Room/Bed   [facility] LABOR AND DELIVERY, L461/1       Discharge Date       Discharge Disposition       Discharge Destination                                 Attending Provider: Meghan Robbins MD    Allergies: No Known Allergies    Isolation: None   Infection: None   Code Status: CPR    Ht: 162.6 cm (64\")   Wt: 94.5 kg (208 lb 5.4 oz)    Admission Cmt: None   Principal Problem: Preeclampsia, severe [O14.10]                   Active Insurance as of 2024       Primary Coverage       Payor Plan Insurance Group Employer/Plan Group    [id number]        Payor Plan Address Payor Plan Phone Number Payor Plan Fax Number Effective Dates    [address]   2023 - None Entered    [facility] [id number]         Subscriber Name Subscriber Birth Date Member ID       SELENA VALDES 2000 [id number]                     Emergency Contacts        (Rel.) Home Phone Work Phone Mobile Phone    adamsaneta puentesie (Sister) -- -- [phone]    Thong Tapia (Significant Other) -- -- [phone]                 History & Physical        Luciana Blas MD at 24 [address] [place] [address]  Obstetric History and Physical     Chief Complaint: patient having headache, worsened with position changes, she denies vision changes, but she also reports left sided weaknesses     Subjective    Patient is a 23 y.o. female  currently postpartum from S/P PLTCD POD 4, antepartum and postpartum preeclampsia s/p treatment with IV magnesium " sulfate.      Her prenatal care is c/b severe preeclampsia, PLTCD, and anemia.  Patient presented with severe headache, severe range BP, and reports of left sided tingling and weakness. She reports symptoms worsened today. She states she is taking labetalol 200mg meds and last took this morning. But she became concerned when headache became worst, more on the left side, but denies vision changes. She is taking ibuprofen and tylenol at home but could not recall last dose. She does report headache becomes worst with position changes, when sitting, up and going from sitting to standing. Patient did have an attempted spinal prior to CD but failed placement and resulted in general anesthesia.     Prior to presenting to L&D, she did receive one dose of IV labetalol 20mg, and severe range Bps resolved.     Prenatal Information:  See office H&P for full details and labs.      Past OB History:       OB History    Para Term  AB Living   1 1 0 1 0 1   SAB IAB Ectopic Molar Multiple Live Births   0 0 0 0 0 1               Past Medical History: Past Medical History:   Diagnosis Date    Known health problems: none         Past Surgical History Past Surgical History:   Procedure Laterality Date     SECTION N/A 2024    Procedure:  SECTION PRIMARY;  Surgeon: Meghan Robbins MD;  [facility]: [facility] LABOR DELIVERY;  Service: Gynecology;  Laterality: N/A;    NO PAST SURGERIES          Family History: No family history on file.   Social History:  reports that she has never smoked. She has never used smokeless tobacco.   reports no history of alcohol use.   reports no history of drug use.        General ROS: Pertinent items are noted in HPI    Objective     Vitals:     Vitals:    24 1913 24 2101   BP: 168/96 144/93 142/83   BP Location: Left arm     Patient Position: Sitting     Pulse: 96 85 65   Resp: 16 23 21   Temp: 98.7 °F (37.1 °C)     TempSrc: Oral     SpO2: 99% 97%  "97%   Weight: 96.3 kg (212 lb 4.9 oz)     Height: 162.6 cm (64\")              Physical Exam:     General Appearance:    Alert, cooperative, in no acute distress   Abdomen:     Soft, non-tender, no guarding, no rebound tenderness,      Incision c/d/I with steri strips in place   Pelvic Exam:    def   Extremities:   Moves all extremities well, no edema, no cyanosis, no              redness, no clonus, 2+ reflexes   Skin:   No bleeding, bruising or rash   Neurologic:   No focal neurologic defect          Laboratory Results:   Lab Results (last 48 hours)       Procedure Component Value Units Date/Time    Comprehensive Metabolic Panel [[id number]]  (Abnormal) Collected: 01/28/24 1956    Specimen: Blood from Arm, Right Updated: 01/28/24 2024     Glucose 85 mg/dL      BUN 11 mg/dL      Creatinine 0.77 mg/dL      Sodium 142 mmol/L      Potassium 4.2 mmol/L      Chloride 107 mmol/L      CO2 24.0 mmol/L      Calcium 9.6 mg/dL      Total Protein 6.6 g/dL      Albumin 3.4 g/dL      ALT (SGPT) 23 U/L      AST (SGOT) 25 U/L      Alkaline Phosphatase 117 U/L      Total Bilirubin 0.3 mg/dL      Globulin 3.2 gm/dL      A/G Ratio 1.1 g/dL      BUN/Creatinine Ratio 14.3     Anion Gap 11.0 mmol/L      eGFR 112.7 mL/min/1.73     Narrative:      GFR Normal >60  Chronic Kidney Disease <60  Kidney Failure <15      Magnesium [[id number]]  (Normal) Collected: 01/28/24 1956    Specimen: Blood from Arm, Right Updated: 01/28/24 2024     Magnesium 1.7 mg/dL     Protime-INR [[id number]]  (Abnormal) Collected: 01/28/24 1956    Specimen: Blood from Arm, Right Updated: 01/28/24 2019     Protime 10.0 Seconds      INR <0.93    aPTT [[id number]]  (Abnormal) Collected: 01/28/24 1956    Specimen: Blood from Arm, Right Updated: 01/28/24 2019     PTT 25.2 seconds     CBC & Differential [[id number]]  (Abnormal) Collected: 01/28/24 1956    Specimen: Blood from Arm, Right Updated: 01/28/24 2005    Narrative:      The following orders were created for panel order " CBC & Differential.  Procedure                               Abnormality         Status                     ---------                               -----------         ------                     CBC Auto Differential[[id number]]        Abnormal            Final result                 Please view results for these tests on the individual orders.    CBC Auto Differential [[id number]]  (Abnormal) Collected: 01/28/24 1956    Specimen: Blood from Arm, Right Updated: 01/28/24 2005     WBC 10.30 10*3/mm3      RBC 3.23 10*6/mm3      Hemoglobin 10.1 g/dL      Hematocrit 30.2 %      MCV 93.5 fL      MCH 31.3 pg      MCHC 33.5 g/dL      RDW 13.4 %      RDW-SD 46.4 fl      MPV 6.8 fL      Platelets 385 10*3/mm3      Neutrophil % 59.3 %      Lymphocyte % 30.1 %      Monocyte % 6.7 %      Eosinophil % 3.3 %      Basophil % 0.6 %      Neutrophils, Absolute 6.10 10*3/mm3      Lymphocytes, Absolute 3.10 10*3/mm3      Monocytes, Absolute 0.70 10*3/mm3      Eosinophils, Absolute 0.30 10*3/mm3      Basophils, Absolute 0.10 10*3/mm3      nRBC 0.0 /100 WBC     POC Glucose Once [[id number]]  (Normal) Collected: 01/28/24 1915    Specimen: Blood Updated: 01/28/24 1917     Glucose 80 mg/dL      Comment: Serial Number: [id number]:  [id number]                  Assessment & Plan    Principal Problem:    Preeclampsia, severe         Assessment:  1.  POD 4 s/p PLTCD, with headache, severe range BP, and left sided weakness, admitted for observation   2.  Severe preeclampsia, plan to repeat dose of IV magnesium  3.  Headache: could be complicated by hypertensive disease vs spinal headache    Plan:  1. Severe preeclampsia:   Repeat labs, all wnl, no signs of severe features besides headache and severe range BP  Due to sx described above, plan to reinitiate IV magnesium   Plan for strict BP monitoring, and strict I/Os   Continue scheduled labetalol 200mg BID, likely will need increase dose of labetalol therapy but will hold off changing pending BP  monitoring, consider cardiology consult in AM   Consider discontinuing IV magnesium sulfate 12-24 hours    Headache:   Could be associated with hypertensive disease, but patient at high risk for spinal headache   Plan for tylenol, ibuprofen, reglan, and fioricet as needed for headache management  If sx still worsening and not improved with medical management, will recommend anesthesia consultation for assessment for possible blood patch     2. Plan of care has been reviewed with patient.  3.  Risks, benefits of treatment plan have been discussed.  4.  All questions have been answered.  5. Postpartum: medications ordered for pain control, breast pump ordered          Luciana Blsa MD   2024   21:32 EST    Electronically signed by Luciana Blas MD at 24 0109          Emergency Department Notes        Jose A Appiah,  at 24 1941          Subjective   History of Present Illness  Chief complaint: Patient is a 23-year-old who is postpartum from  section.  She had preeclampsia during her third trimester.  She was discharged yesterday from the hospital for .  She had an induction that progressed to require .  She was given magnesium here in the hospital.  Yesterday she began developing a headache when that worsened when she got home.  She developed some left facial numbness and some weakness in her left arm.  She feels weak when she pushes off with that arm.  That began yesterday.  Has progressed and persisted here today.  She took her labetalol today.  Presents to the emergency department hypertensive.    Context:    Duration:    Timing:    Severity: Severe    Associated Symptoms:        PCP:  LMP:      Review of Systems   Constitutional:  Negative for fever.   Respiratory: Negative.     Cardiovascular: Negative.    Gastrointestinal: Negative.  Negative for abdominal pain.   Genitourinary: Negative.    Neurological:  Positive for weakness, numbness and headaches.    Psychiatric/Behavioral:  Negative for confusion.        Past Medical History:   Diagnosis Date    Known health problems: none        No Known Allergies    Past Surgical History:   Procedure Laterality Date     SECTION N/A 2024    Procedure:  SECTION PRIMARY;  Surgeon: Meghan Robbins MD;  Location: [facility] LABOR DELIVERY;  Service: Gynecology;  Laterality: N/A;    NO PAST SURGERIES         No family history on file.    Social History     Socioeconomic History    Marital status: Single   Tobacco Use    Smoking status: Never    Smokeless tobacco: Never   Vaping Use    Vaping Use: Former   Substance and Sexual Activity    Alcohol use: Never    Drug use: Never    Sexual activity: Defer           Objective   Physical Exam  Vitals and nursing note reviewed.   Constitutional:       Appearance: Normal appearance.   HENT:      Head: Normocephalic and atraumatic.   Eyes:      Extraocular Movements: Extraocular movements intact.      Pupils: Pupils are equal, round, and reactive to light.   Cardiovascular:      Rate and Rhythm: Normal rate and regular rhythm.   Pulmonary:      Effort: Pulmonary effort is normal.      Breath sounds: Normal breath sounds.   Abdominal:      Palpations: Abdomen is soft.   Musculoskeletal:      Cervical back: Normal range of motion and neck supple.   Skin:     General: Skin is warm and dry.   Neurological:      Mental Status: She is alert and oriented to person, place, and time.      Comments: Patient does have some altered sensation in the left face.  She has no drift in her extremities.   Psychiatric:         Mood and Affect: Mood normal.         Thought Content: Thought content normal.         Procedures          ED Course  ED Course as of 01/28/24 2054   Sun 2024   2002 Spoke with Dr Arredondo.  He recommends ct head without and ct venogram. [LH]      ED Course User Index  [LH] Jose A Appiah DO                                   Results for orders  placed or performed during the hospital encounter of 01/28/24   Comprehensive Metabolic Panel    Specimen: Arm, Right; Blood   Result Value Ref Range    Glucose 85 65 - 99 mg/dL    BUN 11 6 - 20 mg/dL    Creatinine 0.77 0.57 - 1.00 mg/dL    Sodium 142 136 - 145 mmol/L    Potassium 4.2 3.5 - 5.2 mmol/L    Chloride 107 98 - 107 mmol/L    CO2 24.0 22.0 - 29.0 mmol/L    Calcium 9.6 8.6 - 10.5 mg/dL    Total Protein 6.6 6.0 - 8.5 g/dL    Albumin 3.4 (L) 3.5 - 5.2 g/dL    ALT (SGPT) 23 1 - 33 U/L    AST (SGOT) 25 1 - 32 U/L    Alkaline Phosphatase 117 39 - 117 U/L    Total Bilirubin 0.3 0.0 - 1.2 mg/dL    Globulin 3.2 gm/dL    A/G Ratio 1.1 g/dL    BUN/Creatinine Ratio 14.3 7.0 - 25.0    Anion Gap 11.0 5.0 - 15.0 mmol/L    eGFR 112.7 >60.0 mL/min/1.73   Protime-INR    Specimen: Arm, Right; Blood   Result Value Ref Range    Protime 10.0 9.6 - 11.7 Seconds    INR <0.93 (L) 0.93 - 1.10   aPTT    Specimen: Arm, Right; Blood   Result Value Ref Range    PTT 25.2 (L) 61.0 - 76.5 seconds   CBC Auto Differential    Specimen: Arm, Right; Blood   Result Value Ref Range    WBC 10.30 3.40 - 10.80 10*3/mm3    RBC 3.23 (L) 3.77 - 5.28 10*6/mm3    Hemoglobin 10.1 (L) 12.0 - 15.9 g/dL    Hematocrit 30.2 (L) 34.0 - 46.6 %    MCV 93.5 79.0 - 97.0 fL    MCH 31.3 26.6 - 33.0 pg    MCHC 33.5 31.5 - 35.7 g/dL    RDW 13.4 12.3 - 15.4 %    RDW-SD 46.4 37.0 - 54.0 fl    MPV 6.8 6.0 - 12.0 fL    Platelets 385 140 - 450 10*3/mm3    Neutrophil % 59.3 42.7 - 76.0 %    Lymphocyte % 30.1 19.6 - 45.3 %    Monocyte % 6.7 5.0 - 12.0 %    Eosinophil % 3.3 0.3 - 6.2 %    Basophil % 0.6 0.0 - 1.5 %    Neutrophils, Absolute 6.10 1.70 - 7.00 10*3/mm3    Lymphocytes, Absolute 3.10 0.70 - 3.10 10*3/mm3    Monocytes, Absolute 0.70 0.10 - 0.90 10*3/mm3    Eosinophils, Absolute 0.30 0.00 - 0.40 10*3/mm3    Basophils, Absolute 0.10 0.00 - 0.20 10*3/mm3    nRBC 0.0 0.0 - 0.2 /100 WBC   Magnesium    Specimen: Arm, Right; Blood   Result Value Ref Range    Magnesium 1.7  1.6 - 2.6 mg/dL   POC Glucose Once    Specimen: Blood   Result Value Ref Range    Glucose 80 70 - 105 mg/dL        CT venogram head    Result Date: 1/28/2024  Impression: No acute intracranial process identified. Venous structures appear grossly unremarkable. No obvious thrombus or filling defect identified. Electronically Signed: Natalie Ch MD  1/28/2024 9:06 PM EST  Workstation ID: [id number]    CT Head Without Contrast    Result Date: 1/28/2024  Impression: No acute intracranial process identified. Electronically Signed: Natalie Ch MD  1/28/2024 9:05 PM EST  Workstation ID: [id number]          Medical Decision Making  Patient was seen evaluated for the above problem    Differential diagnosis includes but is not limited to eclampsia, preeclampsia, dural venous thrombosis, intracerebral hemorrhage,    Patient is hypertensive and did require labetalol here in the emergency department.  First 20 mg dose got her blood pressure down to 144/93.  She is awake and oriented.  She has not had seizure activity but she does have a new headache with left-sided numbness and some arm tremulous weakness when she pushes off on it.  I discussed with Dr. Arredondo.  This started yesterday.  He recommends CT head without and CT venogram.  I discussed with  who states that magnesium at this point, she has already had it would be of no benefit.  She will admit the patient to the hospital.  CT scan non contrast and ct venogram show no emergent finding.      Amount and/or Complexity of Data Reviewed  Labs: ordered. Decision-making details documented in ED Course.     Details: Labs reviewed by myself  Radiology: ordered and independent interpretation performed.     Details: CTs reviewed by myself    Risk  Prescription drug management.  Decision regarding hospitalization.        Final diagnoses:   None     Postpartum preeclampsia  Uncontrolled hypertension  Headache postpartum  ED Disposition  ED Disposition       None            No  follow-up provider specified.       Medication List      No changes were made to your prescriptions during this visit.            Jose A Appiah,   01/28/24 2126      Electronically signed by Jose A Appiah DO at 01/28/24 2126       Operative/Procedure Notes (last 48 hours)  Notes from 01/27/24 1110 through 01/29/24 1110   No notes of this type exist for this encounter.       Physician Progress Notes (last 48 hours)  Notes from 01/27/24 1110 through 01/29/24 1110   No notes of this type exist for this encounter.       Consult Notes (last 48 hours)  Notes from 01/27/24 1110 through 01/29/24 1110   No notes of this type exist for this encounter.

## 2024-01-29 NOTE — PAYOR COMM NOTE
"This is clinical update for Selena Valdes  Reference/Auth#: [id number]     NOTE:   PT DISCHARGED ON 24 S/P  DELIVERY ON 24.  RETURNED TO ER AND RE-ADMITTED INPT ON 24 FOR SEVERE POSTPARTUM PRE-ECLAMPSIA.       AUTHORIZATION PENDING:     Please call or fax determination to contact below.   Thank you.    YANET RossN, RN, CCM  Utilization Review Nurse  [facility]  Direct & confidential phone # [phone]  Fax # [phone]      Selena Valdes (23 y.o. Female)       Date of Birth   2000    Social Security Number       Address   [address] [place] [address] [place] [facility]    Home Phone   [phone]    MRN   [id number]       Yarsanism   None    Marital Status   Single                            Admission Date   24    Admission Type   Emergency    Admitting Provider   Luciana Blas MD    Attending Provider   Meghan Robbins MD    Department, Room/Bed   [facility] LABOR AND DELIVERY, L461/1       Discharge Date       Discharge Disposition       Discharge Destination                                 Attending Provider: Meghan Robbins MD    Allergies: No Known Allergies    Isolation: None   Infection: None   Code Status: CPR    Ht: 162.6 cm (64\")   Wt: 94.5 kg (208 lb 5.4 oz)    Admission Cmt: None   Principal Problem: Preeclampsia, severe [O14.10]                   Active Insurance as of 2024       Primary Coverage       Payor Plan Insurance Group Employer/Plan Group    [facility] [place] [facility]        Payor Plan Address Payor Plan Phone Number Payor Plan Fax Number Effective Dates    [address]   2023 - None Entered    [facility] [id number]         Subscriber Name Subscriber Birth Date Member ID       SELENA VALDES 2000 [id number]                     Emergency Contacts        (Rel.) Home Phone Work Phone Mobile Phone    karis adams (Sister) -- -- [phone]    Thong Tapia " (Significant Other) -- -- [phone]                 History & Physical        Luciana Blas MD at 24          [facility]  Obstetric History and Physical     Chief Complaint: patient having headache, worsened with position changes, she denies vision changes, but she also reports left sided weaknesses     Subjective    Patient is a 23 y.o. female  currently postpartum from S/P PLTCD POD 4, antepartum and postpartum preeclampsia s/p treatment with IV magnesium sulfate.      Her prenatal care is c/b severe preeclampsia, PLTCD, and anemia.  Patient presented with severe headache, severe range BP, and reports of left sided tingling and weakness. She reports symptoms worsened today. She states she is taking labetalol 200mg meds and last took this morning. But she became concerned when headache became worst, more on the left side, but denies vision changes. She is taking ibuprofen and tylenol at home but could not recall last dose. She does report headache becomes worst with position changes, when sitting, up and going from sitting to standing. Patient did have an attempted spinal prior to CD but failed placement and resulted in general anesthesia.     Prior to presenting to L&D, she did receive one dose of IV labetalol 20mg, and severe range Bps resolved.     Prenatal Information:  See office H&P for full details and labs.      Past OB History:       OB History    Para Term  AB Living   1 1 0 1 0 1   SAB IAB Ectopic Molar Multiple Live Births   0 0 0 0 0 1               Past Medical History: Past Medical History:   Diagnosis Date    Known health problems: none         Past Surgical History Past Surgical History:   Procedure Laterality Date     SECTION N/A 2024    Procedure:  SECTION PRIMARY;  Surgeon: Meghan Robbins MD;  Location: [facility] LABOR DELIVERY;  Service: Gynecology;  Laterality: N/A;    NO PAST SURGERIES          Family History: No family history on  "file.   Social History:  reports that she has never smoked. She has never used smokeless tobacco.   reports no history of alcohol use.   reports no history of drug use.        General ROS: Pertinent items are noted in HPI    Objective     Vitals:     Vitals:    01/28/24 1913 01/28/24 2000 01/28/24 2101   BP: 168/96 144/93 142/83   BP Location: Left arm     Patient Position: Sitting     Pulse: 96 85 65   Resp: 16 23 21   Temp: 98.7 °F (37.1 °C)     TempSrc: Oral     SpO2: 99% 97% 97%   Weight: 96.3 kg (212 lb 4.9 oz)     Height: 162.6 cm (64\")              Physical Exam:     General Appearance:    Alert, cooperative, in no acute distress   Abdomen:     Soft, non-tender, no guarding, no rebound tenderness,      Incision c/d/I with steri strips in place   Pelvic Exam:    def   Extremities:   Moves all extremities well, no edema, no cyanosis, no              redness, no clonus, 2+ reflexes   Skin:   No bleeding, bruising or rash   Neurologic:   No focal neurologic defect          Laboratory Results:   Lab Results (last 48 hours)       Procedure Component Value Units Date/Time    Comprehensive Metabolic Panel [[id number]]  (Abnormal) Collected: 01/28/24 1956    Specimen: Blood from Arm, Right Updated: 01/28/24 2024     Glucose 85 mg/dL      BUN 11 mg/dL      Creatinine 0.77 mg/dL      Sodium 142 mmol/L      Potassium 4.2 mmol/L      Chloride 107 mmol/L      CO2 24.0 mmol/L      Calcium 9.6 mg/dL      Total Protein 6.6 g/dL      Albumin 3.4 g/dL      ALT (SGPT) 23 U/L      AST (SGOT) 25 U/L      Alkaline Phosphatase 117 U/L      Total Bilirubin 0.3 mg/dL      Globulin 3.2 gm/dL      A/G Ratio 1.1 g/dL      BUN/Creatinine Ratio 14.3     Anion Gap 11.0 mmol/L      eGFR 112.7 mL/min/1.73     Narrative:      GFR Normal >60  Chronic Kidney Disease <60  Kidney Failure <15      Magnesium [[id number]]  (Normal) Collected: 01/28/24 1956    Specimen: Blood from Arm, Right Updated: 01/28/24 2024     Magnesium 1.7 mg/dL     Protime-INR " [[id number]]  (Abnormal) Collected: 01/28/24 1956    Specimen: Blood from Arm, Right Updated: 01/28/24 2019     Protime 10.0 Seconds      INR <0.93    aPTT [[id number]]  (Abnormal) Collected: 01/28/24 1956    Specimen: Blood from Arm, Right Updated: 01/28/24 2019     PTT 25.2 seconds     CBC & Differential [[id number]]  (Abnormal) Collected: 01/28/24 1956    Specimen: Blood from Arm, Right Updated: 01/28/24 2005    Narrative:      The following orders were created for panel order CBC & Differential.  Procedure                               Abnormality         Status                     ---------                               -----------         ------                     CBC Auto Differential[[id number]]        Abnormal            Final result                 Please view results for these tests on the individual orders.    CBC Auto Differential [[id number]]  (Abnormal) Collected: 01/28/24 1956    Specimen: Blood from Arm, Right Updated: 01/28/24 2005     WBC 10.30 10*3/mm3      RBC 3.23 10*6/mm3      Hemoglobin 10.1 g/dL      Hematocrit 30.2 %      MCV 93.5 fL      MCH 31.3 pg      MCHC 33.5 g/dL      RDW 13.4 %      RDW-SD 46.4 fl      MPV 6.8 fL      Platelets 385 10*3/mm3      Neutrophil % 59.3 %      Lymphocyte % 30.1 %      Monocyte % 6.7 %      Eosinophil % 3.3 %      Basophil % 0.6 %      Neutrophils, Absolute 6.10 10*3/mm3      Lymphocytes, Absolute 3.10 10*3/mm3      Monocytes, Absolute 0.70 10*3/mm3      Eosinophils, Absolute 0.30 10*3/mm3      Basophils, Absolute 0.10 10*3/mm3      nRBC 0.0 /100 WBC     POC Glucose Once [[id number]]  (Normal) Collected: 01/28/24 1915    Specimen: Blood Updated: 01/28/24 1917     Glucose 80 mg/dL      Comment: Serial Number: [id number]:  [id number]                  Assessment & Plan    Principal Problem:    Preeclampsia, severe         Assessment:  1.  POD 4 s/p PLTCD, with headache, severe range BP, and left sided weakness, admitted for observation   2.  Severe  preeclampsia, plan to repeat dose of IV magnesium  3.  Headache: could be complicated by hypertensive disease vs spinal headache    Plan:  1. Severe preeclampsia:   Repeat labs, all wnl, no signs of severe features besides headache and severe range BP  Due to sx described above, plan to reinitiate IV magnesium   Plan for strict BP monitoring, and strict I/Os   Continue scheduled labetalol 200mg BID, likely will need increase dose of labetalol therapy but will hold off changing pending BP monitoring, consider cardiology consult in AM   Consider discontinuing IV magnesium sulfate 12-24 hours    Headache:   Could be associated with hypertensive disease, but patient at high risk for spinal headache   Plan for tylenol, ibuprofen, reglan, and fioricet as needed for headache management  If sx still worsening and not improved with medical management, will recommend anesthesia consultation for assessment for possible blood patch     2. Plan of care has been reviewed with patient.  3.  Risks, benefits of treatment plan have been discussed.  4.  All questions have been answered.  5. Postpartum: medications ordered for pain control, breast pump ordered          Luciana Blas MD   2024   21:32 EST    Electronically signed by Luciana Blas MD at 24 0109          Emergency Department Notes        Jose A Appiah,  at 24 1941          Subjective   History of Present Illness  Chief complaint: Patient is a 23-year-old who is postpartum from  section.  She had preeclampsia during her third trimester.  She was discharged yesterday from the hospital for .  She had an induction that progressed to require .  She was given magnesium here in the hospital.  Yesterday she began developing a headache when that worsened when she got home.  She developed some left facial numbness and some weakness in her left arm.  She feels weak when she pushes off with that arm.  That began yesterday.  Has  progressed and persisted here today.  She took her labetalol today.  Presents to the emergency department hypertensive.    Context:    Duration:    Timing:    Severity: Severe    Associated Symptoms:        PCP:  LMP:      Review of Systems   Constitutional:  Negative for fever.   Respiratory: Negative.     Cardiovascular: Negative.    Gastrointestinal: Negative.  Negative for abdominal pain.   Genitourinary: Negative.    Neurological:  Positive for weakness, numbness and headaches.   Psychiatric/Behavioral:  Negative for confusion.        Past Medical History:   Diagnosis Date    Known health problems: none        No Known Allergies    Past Surgical History:   Procedure Laterality Date     SECTION N/A 2024    Procedure:  SECTION PRIMARY;  Surgeon: Meghan Robbins MD;  Location: [facility] LABOR DELIVERY;  Service: Gynecology;  Laterality: N/A;    NO PAST SURGERIES         No family history on file.    Social History     Socioeconomic History    Marital status: Single   Tobacco Use    Smoking status: Never    Smokeless tobacco: Never   Vaping Use    Vaping Use: Former   Substance and Sexual Activity    Alcohol use: Never    Drug use: Never    Sexual activity: Defer           Objective   Physical Exam  Vitals and nursing note reviewed.   Constitutional:       Appearance: Normal appearance.   HENT:      Head: Normocephalic and atraumatic.   Eyes:      Extraocular Movements: Extraocular movements intact.      Pupils: Pupils are equal, round, and reactive to light.   Cardiovascular:      Rate and Rhythm: Normal rate and regular rhythm.   Pulmonary:      Effort: Pulmonary effort is normal.      Breath sounds: Normal breath sounds.   Abdominal:      Palpations: Abdomen is soft.   Musculoskeletal:      Cervical back: Normal range of motion and neck supple.   Skin:     General: Skin is warm and dry.   Neurological:      Mental Status: She is alert and oriented to person, place, and time.       Comments: Patient does have some altered sensation in the left face.  She has no drift in her extremities.   Psychiatric:         Mood and Affect: Mood normal.         Thought Content: Thought content normal.         Procedures          ED Course  ED Course as of 01/28/24 2054   Sun Jan 28, 2024   2002 Spoke with Dr Arredondo.  He recommends ct head without and ct venogram. [LH]      ED Course User Index  [LH] Jose A Appiah,                                    Results for orders placed or performed during the hospital encounter of 01/28/24   Comprehensive Metabolic Panel    Specimen: Arm, Right; Blood   Result Value Ref Range    Glucose 85 65 - 99 mg/dL    BUN 11 6 - 20 mg/dL    Creatinine 0.77 0.57 - 1.00 mg/dL    Sodium 142 136 - 145 mmol/L    Potassium 4.2 3.5 - 5.2 mmol/L    Chloride 107 98 - 107 mmol/L    CO2 24.0 22.0 - 29.0 mmol/L    Calcium 9.6 8.6 - 10.5 mg/dL    Total Protein 6.6 6.0 - 8.5 g/dL    Albumin 3.4 (L) 3.5 - 5.2 g/dL    ALT (SGPT) 23 1 - 33 U/L    AST (SGOT) 25 1 - 32 U/L    Alkaline Phosphatase 117 39 - 117 U/L    Total Bilirubin 0.3 0.0 - 1.2 mg/dL    Globulin 3.2 gm/dL    A/G Ratio 1.1 g/dL    BUN/Creatinine Ratio 14.3 7.0 - 25.0    Anion Gap 11.0 5.0 - 15.0 mmol/L    eGFR 112.7 >60.0 mL/min/1.73   Protime-INR    Specimen: Arm, Right; Blood   Result Value Ref Range    Protime 10.0 9.6 - 11.7 Seconds    INR <0.93 (L) 0.93 - 1.10   aPTT    Specimen: Arm, Right; Blood   Result Value Ref Range    PTT 25.2 (L) 61.0 - 76.5 seconds   CBC Auto Differential    Specimen: Arm, Right; Blood   Result Value Ref Range    WBC 10.30 3.40 - 10.80 10*3/mm3    RBC 3.23 (L) 3.77 - 5.28 10*6/mm3    Hemoglobin 10.1 (L) 12.0 - 15.9 g/dL    Hematocrit 30.2 (L) 34.0 - 46.6 %    MCV 93.5 79.0 - 97.0 fL    MCH 31.3 26.6 - 33.0 pg    MCHC 33.5 31.5 - 35.7 g/dL    RDW 13.4 12.3 - 15.4 %    RDW-SD 46.4 37.0 - 54.0 fl    MPV 6.8 6.0 - 12.0 fL    Platelets 385 140 - 450 10*3/mm3    Neutrophil % 59.3 42.7 - 76.0 %     Lymphocyte % 30.1 19.6 - 45.3 %    Monocyte % 6.7 5.0 - 12.0 %    Eosinophil % 3.3 0.3 - 6.2 %    Basophil % 0.6 0.0 - 1.5 %    Neutrophils, Absolute 6.10 1.70 - 7.00 10*3/mm3    Lymphocytes, Absolute 3.10 0.70 - 3.10 10*3/mm3    Monocytes, Absolute 0.70 0.10 - 0.90 10*3/mm3    Eosinophils, Absolute 0.30 0.00 - 0.40 10*3/mm3    Basophils, Absolute 0.10 0.00 - 0.20 10*3/mm3    nRBC 0.0 0.0 - 0.2 /100 WBC   Magnesium    Specimen: Arm, Right; Blood   Result Value Ref Range    Magnesium 1.7 1.6 - 2.6 mg/dL   POC Glucose Once    Specimen: Blood   Result Value Ref Range    Glucose 80 70 - 105 mg/dL        CT venogram head    Result Date: 1/28/2024  Impression: No acute intracranial process identified. Venous structures appear grossly unremarkable. No obvious thrombus or filling defect identified. Electronically Signed: Natalie Ch MD  1/28/2024 9:06 PM EST  Workstation ID: [id number]    CT Head Without Contrast    Result Date: 1/28/2024  Impression: No acute intracranial process identified. Electronically Signed: Natalie Ch MD  1/28/2024 9:05 PM EST  Workstation ID: [id number]          Medical Decision Making  Patient was seen evaluated for the above problem    Differential diagnosis includes but is not limited to eclampsia, preeclampsia, dural venous thrombosis, intracerebral hemorrhage,    Patient is hypertensive and did require labetalol here in the emergency department.  First 20 mg dose got her blood pressure down to 144/93.  She is awake and oriented.  She has not had seizure activity but she does have a new headache with left-sided numbness and some arm tremulous weakness when she pushes off on it.  I discussed with Dr. Arredondo.  This started yesterday.  He recommends CT head without and CT venogram.  I discussed with  who states that magnesium at this point, she has already had it would be of no benefit.  She will admit the patient to the hospital.  CT scan non contrast and ct venogram show no emergent  finding.      Amount and/or Complexity of Data Reviewed  Labs: ordered. Decision-making details documented in ED Course.     Details: Labs reviewed by myself  Radiology: ordered and independent interpretation performed.     Details: CTs reviewed by myself    Risk  Prescription drug management.  Decision regarding hospitalization.        Final diagnoses:   None     Postpartum preeclampsia  Uncontrolled hypertension  Headache postpartum  ED Disposition  ED Disposition       None            No follow-up provider specified.       Medication List      No changes were made to your prescriptions during this visit.            Jose A Appiah DO  01/28/24 2126      Electronically signed by Jose A Appiah DO at 01/28/24 2126       Physician Progress Notes (last 48 hours)  Notes from 01/27/24 1159 through 01/29/24 1159   No notes of this type exist for this encounter.       Consult Notes (last 48 hours)  Notes from 01/27/24 1159 through 01/29/24 1159   No notes of this type exist for this encounter.

## 2024-01-30 NOTE — SIGNIFICANT NOTE
Case Management Discharge Note                Selected Continued Care - Discharged on 1/29/2024 Admission date: 1/28/2024 - Discharge disposition: Home or Self Care              Transportation Services  Private: Car    Final Discharge Disposition Code: (P) 01 - home or self-care

## 2024-01-30 NOTE — PAYOR COMM NOTE
"This is discharge notification for Selena Valdes  Reference/Auth # [id number]   Pt discharged routine to home on 1/29/24.    AUTHORIZATION PENDING:     Please call or fax determination to contact below.   Thank you.    CASEY Ross, RN, [facility]  Utilization Review Nurse  [facility]  Direct & confidential phone # [phone]  Fax # [phone]          Selena Valdes (23 y.o. Female)       Date of Birth   2000    Social Security Number       Address   [address] [facility] [place] [facility]    Home Phone   [phone]    MRN   [id number]       Scientologist   None    Marital Status   Single                            Admission Date   1/28/24    Admission Type   Emergency    Admitting Provider   Luciana Blas MD    Attending Provider       Department, Room/Bed   [facility] LABOR AND DELIVERY, L461/1       Discharge Date   1/29/2024    Discharge Disposition   Home or Self Care    Discharge Destination                                 Attending Provider: (none)   Allergies: No Known Allergies    Isolation: None   Infection: None   Code Status: Prior    Ht: 162.6 cm (64\")   Wt: 94.5 kg (208 lb 5.4 oz)    Admission Cmt: None   Principal Problem: Preeclampsia, severe [O14.10]                   Active Insurance as of 1/28/2024       Primary Coverage       Payor Plan Insurance Group Employer/Plan Group    [facility] [place] [id number]        Payor Plan Address Payor Plan Phone Number Payor Plan Fax Number Effective Dates    [address]   12/1/2023 - None Entered    [facility] [address]         Subscriber Name Subscriber Birth Date Member ID       SELENA VALDES 2000 [id number]                     Emergency Contacts        (Rel.) Home Phone Work Phone Mobile Phone    karis adams (Sister) -- -- [phone]    Thong Tapia (Significant Other) -- -- [phone]              Discharge Summary    No notes of this type exist for this encounter.   "

## (undated) DEVICE — SUT MNCRYL 3/0 CT1 36 IN Y944H

## (undated) DEVICE — SOL IRRIG NACL 9PCT 1000ML BTL

## (undated) DEVICE — Device

## (undated) DEVICE — GLV SURG SENSICARE PI MIC PF SZ6.5 LF STRL

## (undated) DEVICE — DRSNG WND BORDR/ADHS NONADHR/GZ LF 4X10IN STRL

## (undated) DEVICE — SPNG LAP PREWSH SFTPK 18X18IN STRL PK/5

## (undated) DEVICE — SOL IRRIG H2O 1000ML STRL

## (undated) DEVICE — TRY SADDLE BLCK 25G

## (undated) DEVICE — PK C SECT 50